# Patient Record
Sex: MALE | Race: WHITE | NOT HISPANIC OR LATINO
[De-identification: names, ages, dates, MRNs, and addresses within clinical notes are randomized per-mention and may not be internally consistent; named-entity substitution may affect disease eponyms.]

---

## 2018-11-01 ENCOUNTER — RECORD ABSTRACTING (OUTPATIENT)
Age: 53
End: 2018-11-01

## 2018-11-01 DIAGNOSIS — Z78.9 OTHER SPECIFIED HEALTH STATUS: ICD-10-CM

## 2018-11-01 DIAGNOSIS — Z86.39 PERSONAL HISTORY OF OTHER ENDOCRINE, NUTRITIONAL AND METABOLIC DISEASE: ICD-10-CM

## 2018-11-01 DIAGNOSIS — K57.30 DIVERTICULOSIS OF LARGE INTESTINE W/OUT PERFORATION OR ABSCESS W/OUT BLEEDING: ICD-10-CM

## 2018-11-01 DIAGNOSIS — Z87.09 PERSONAL HISTORY OF OTHER DISEASES OF THE RESPIRATORY SYSTEM: ICD-10-CM

## 2018-11-01 DIAGNOSIS — Z86.79 PERSONAL HISTORY OF OTHER DISEASES OF THE CIRCULATORY SYSTEM: ICD-10-CM

## 2018-11-01 DIAGNOSIS — Z83.3 FAMILY HISTORY OF DIABETES MELLITUS: ICD-10-CM

## 2018-12-04 ENCOUNTER — APPOINTMENT (OUTPATIENT)
Dept: FAMILY MEDICINE | Facility: CLINIC | Age: 53
End: 2018-12-04
Payer: COMMERCIAL

## 2018-12-04 VITALS
WEIGHT: 270 LBS | DIASTOLIC BLOOD PRESSURE: 70 MMHG | BODY MASS INDEX: 33.57 KG/M2 | SYSTOLIC BLOOD PRESSURE: 120 MMHG | HEIGHT: 75 IN

## 2018-12-04 PROCEDURE — 36415 COLL VENOUS BLD VENIPUNCTURE: CPT

## 2018-12-04 PROCEDURE — 99213 OFFICE O/P EST LOW 20 MIN: CPT | Mod: 25

## 2018-12-04 NOTE — REVIEW OF SYSTEMS
[Fever] : no fever [Chills] : no chills [Vision Problems] : no vision problems [Sore Throat] : no sore throat [Chest Pain] : no chest pain [Palpitations] : no palpitations [Cough] : no cough [Abdominal Pain] : no abdominal pain [Heartburn] : no heartburn [Frequency] : no frequency [Itching] : no itching [Skin Rash] : no skin rash [Headache] : no headache [Dizziness] : no dizziness [FreeTextEntry2] : No increased thirst or urination [FreeTextEntry9] : Improved right plantar fasciitis

## 2018-12-04 NOTE — PLAN
[FreeTextEntry1] : Continue present meds.\par Check fasting lipids, Ha1c.\par Attention to diet, exercise.

## 2018-12-04 NOTE — PHYSICAL EXAM
[No Acute Distress] : no acute distress [Clear to Auscultation] : lungs were clear to auscultation bilaterally [Normal S1, S2] : normal S1 and S2 [No Murmur] : no murmur heard [Soft] : abdomen soft [Non Tender] : non-tender [No Joint Swelling] : no joint swelling [Normal Gait] : normal gait [No Focal Deficits] : no focal deficits [de-identified] : Weight gain noted

## 2018-12-04 NOTE — HISTORY OF PRESENT ILLNESS
[FreeTextEntry1] : "I feel well. Just here for a check up.\par I can't believe I weight 270." [de-identified] : Pt feels well. Using orthotic for plantar fasciitis, which is helpful.\par Using Zorvolex only prn.\par Fasting today.

## 2018-12-06 ENCOUNTER — RX RENEWAL (OUTPATIENT)
Age: 53
End: 2018-12-06

## 2018-12-06 LAB
CHOLEST SERPL-MCNC: 132 MG/DL
CHOLEST/HDLC SERPL: 3.9 RATIO
HBA1C MFR BLD HPLC: 6.4 %
HDLC SERPL-MCNC: 34 MG/DL
LDLC SERPL CALC-MCNC: 60 MG/DL
TRIGL SERPL-MCNC: 191 MG/DL

## 2018-12-13 ENCOUNTER — RX RENEWAL (OUTPATIENT)
Age: 53
End: 2018-12-13

## 2019-03-18 ENCOUNTER — RX RENEWAL (OUTPATIENT)
Age: 54
End: 2019-03-18

## 2019-03-19 ENCOUNTER — APPOINTMENT (OUTPATIENT)
Dept: FAMILY MEDICINE | Facility: CLINIC | Age: 54
End: 2019-03-19

## 2019-03-26 ENCOUNTER — APPOINTMENT (OUTPATIENT)
Dept: FAMILY MEDICINE | Facility: CLINIC | Age: 54
End: 2019-03-26

## 2019-04-20 ENCOUNTER — APPOINTMENT (OUTPATIENT)
Dept: FAMILY MEDICINE | Facility: CLINIC | Age: 54
End: 2019-04-20
Payer: COMMERCIAL

## 2019-04-20 VITALS
DIASTOLIC BLOOD PRESSURE: 80 MMHG | BODY MASS INDEX: 33.32 KG/M2 | WEIGHT: 268 LBS | SYSTOLIC BLOOD PRESSURE: 120 MMHG | HEIGHT: 75 IN

## 2019-04-20 DIAGNOSIS — M50.90 CERVICAL DISC DISORDER, UNSPECIFIED, UNSPECIFIED CERVICAL REGION: ICD-10-CM

## 2019-04-20 PROCEDURE — 99214 OFFICE O/P EST MOD 30 MIN: CPT | Mod: 25

## 2019-04-20 PROCEDURE — 36415 COLL VENOUS BLD VENIPUNCTURE: CPT

## 2019-04-20 NOTE — HISTORY OF PRESENT ILLNESS
[FreeTextEntry1] : "I have had pains in my neck for a few weeks" [de-identified] : Pain in posterior neck, radiates to scapulae and shoulders. No weak grasp. Hears "creaking" on ROM neck

## 2019-04-20 NOTE — HEALTH RISK ASSESSMENT
[HIV Test offered] : HIV Test offered [Patient reported colonoscopy was abnormal] : Patient reported colonoscopy was abnormal [Hepatitis C test offered] : Hepatitis C test offered [ColonoscopyDate] : 02/12 [ColonoscopyComments] : Dr Boyce [HIVDate] : 06/16 [HepatitisCDate] : 06/16

## 2019-04-20 NOTE — PHYSICAL EXAM
[No Acute Distress] : no acute distress [Clear to Auscultation] : lungs were clear to auscultation bilaterally [Normal S1, S2] : normal S1 and S2 [No Murmur] : no murmur heard [Soft] : abdomen soft [Non Tender] : non-tender [Normal Gait] : normal gait [No Joint Swelling] : no joint swelling [de-identified] : Weight gain noted [de-identified] : No palpable masses or tenderness [No Focal Deficits] : no focal deficits [de-identified] : Grasp 5/5 bilaterally. DTRs intact

## 2019-04-20 NOTE — REVIEW OF SYSTEMS
[Fever] : no fever [Chills] : no chills [Sore Throat] : no sore throat [Vision Problems] : no vision problems [Chest Pain] : no chest pain [Palpitations] : no palpitations [Cough] : no cough [Abdominal Pain] : no abdominal pain [Heartburn] : no heartburn [Frequency] : no frequency [Joint Stiffness] : joint stiffness [Joint Pain] : joint pain [Muscle Pain] : muscle pain [Muscle Weakness] : no muscle weakness [Itching] : no itching [Joint Swelling] : no joint swelling [Skin Rash] : no skin rash [Headache] : no headache [Dizziness] : no dizziness [FreeTextEntry2] : No increased thirst or urination [FreeTextEntry9] : As per HPI

## 2019-04-22 LAB
ALBUMIN SERPL ELPH-MCNC: 4.8 G/DL
ALP BLD-CCNC: 59 U/L
ALT SERPL-CCNC: 62 U/L
ANION GAP SERPL CALC-SCNC: 13 MMOL/L
AST SERPL-CCNC: 51 U/L
BASOPHILS # BLD AUTO: 0.05 K/UL
BASOPHILS NFR BLD AUTO: 1 %
BILIRUB SERPL-MCNC: 0.5 MG/DL
BUN SERPL-MCNC: 18 MG/DL
CALCIUM SERPL-MCNC: 9.5 MG/DL
CHLORIDE SERPL-SCNC: 105 MMOL/L
CHOLEST SERPL-MCNC: 132 MG/DL
CHOLEST/HDLC SERPL: 4.9 RATIO
CO2 SERPL-SCNC: 24 MMOL/L
CREAT SERPL-MCNC: 0.69 MG/DL
EOSINOPHIL # BLD AUTO: 0 K/UL
EOSINOPHIL NFR BLD AUTO: 0 %
ESTIMATED AVERAGE GLUCOSE: 148 MG/DL
GLUCOSE SERPL-MCNC: 157 MG/DL
HBA1C MFR BLD HPLC: 6.8 %
HCT VFR BLD CALC: 48 %
HDLC SERPL-MCNC: 27 MG/DL
HGB BLD-MCNC: 15.2 G/DL
IMM GRANULOCYTES NFR BLD AUTO: 1 %
LDLC SERPL CALC-MCNC: 54 MG/DL
LYMPHOCYTES # BLD AUTO: 1.8 K/UL
LYMPHOCYTES NFR BLD AUTO: 34.4 %
MAN DIFF?: NORMAL
MCHC RBC-ENTMCNC: 30.9 PG
MCHC RBC-ENTMCNC: 31.7 GM/DL
MCV RBC AUTO: 97.6 FL
MONOCYTES # BLD AUTO: 0.35 K/UL
MONOCYTES NFR BLD AUTO: 6.7 %
NEUTROPHILS # BLD AUTO: 2.98 K/UL
NEUTROPHILS NFR BLD AUTO: 56.9 %
PLATELET # BLD AUTO: 187 K/UL
POTASSIUM SERPL-SCNC: 4 MMOL/L
PROT SERPL-MCNC: 7 G/DL
RBC # BLD: 4.92 M/UL
RBC # FLD: 14.4 %
SODIUM SERPL-SCNC: 142 MMOL/L
TRIGL SERPL-MCNC: 257 MG/DL
WBC # FLD AUTO: 5.23 K/UL

## 2019-05-10 ENCOUNTER — RESULT REVIEW (OUTPATIENT)
Age: 54
End: 2019-05-10

## 2019-05-21 ENCOUNTER — APPOINTMENT (OUTPATIENT)
Dept: CARDIOLOGY | Facility: CLINIC | Age: 54
End: 2019-05-21
Payer: COMMERCIAL

## 2019-05-21 ENCOUNTER — RECORD ABSTRACTING (OUTPATIENT)
Age: 54
End: 2019-05-21

## 2019-05-21 ENCOUNTER — RESULT REVIEW (OUTPATIENT)
Age: 54
End: 2019-05-21

## 2019-05-21 VITALS
DIASTOLIC BLOOD PRESSURE: 70 MMHG | SYSTOLIC BLOOD PRESSURE: 134 MMHG | BODY MASS INDEX: 32.33 KG/M2 | HEIGHT: 75 IN | HEART RATE: 83 BPM | WEIGHT: 260 LBS

## 2019-05-21 PROCEDURE — 93015 CV STRESS TEST SUPVJ I&R: CPT

## 2019-05-21 RX ORDER — DICLOFENAC 35 MG/1
35 CAPSULE ORAL
Refills: 0 | Status: DISCONTINUED | COMMUNITY
End: 2019-05-21

## 2019-05-21 NOTE — DISCUSSION/SUMMARY
[FreeTextEntry1] : Problem list/impressions/recommendations.\par Chest discomfort\par No evidence of myocardial ischemia. Stress test performed 5/21/19\par Focus on risk factor reduction.\par \par Hypertension\par Good control\par \par Dyslipidemia\par He is treated with a fibrate.\par Good LDL control. He may continue his fibrate but I also suggest therapeutic i-STAT treatment and a formal nutritional consultation for triglycerides. I do believe he is motivated.\par \par Type 2 diabetes.\par Treated with oral agents and therapeutic lifestyle treatment.\par \par Recommendation\par Focus on standard risk factor reduction\par Followup with his primary care physician\par We will remain available on request.

## 2019-05-21 NOTE — REASON FOR VISIT
[FreeTextEntry1] : Mr. 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 His wife he is nurse Yasmine Zeus at [employer].

## 2019-05-21 NOTE — PHYSICAL EXAM
[General Appearance - Well Developed] : well developed [Normal Appearance] : normal appearance [Well Groomed] : well groomed [General Appearance - Well Nourished] : well nourished [No Deformities] : no deformities [General Appearance - In No Acute Distress] : no acute distress [Normal Conjunctiva] : the conjunctiva exhibited no abnormalities [Eyelids - No Xanthelasma] : the eyelids demonstrated no xanthelasmas [Normal Oral Mucosa] : normal oral mucosa [No Oral Pallor] : no oral pallor [No Oral Cyanosis] : no oral cyanosis [Normal Jugular Venous A Waves Present] : normal jugular venous A waves present [Normal Jugular Venous V Waves Present] : normal jugular venous V waves present [No Jugular Venous Valerio A Waves] : no jugular venous valerio A waves [Respiration, Rhythm And Depth] : normal respiratory rhythm and effort [Exaggerated Use Of Accessory Muscles For Inspiration] : no accessory muscle use [Auscultation Breath Sounds / Voice Sounds] : lungs were clear to auscultation bilaterally [Heart Rate And Rhythm] : heart rate and rhythm were normal [Heart Sounds] : normal S1 and S2 [Murmurs] : no murmurs present [Abdomen Soft] : soft [Abdomen Tenderness] : non-tender [Abdomen Mass (___ Cm)] : no abdominal mass palpated [Abnormal Walk] : normal gait [Gait - Sufficient For Exercise Testing] : the gait was sufficient for exercise testing [Nail Clubbing] : no clubbing of the fingernails [Cyanosis, Localized] : no localized cyanosis [Petechial Hemorrhages (___cm)] : no petechial hemorrhages [Skin Color & Pigmentation] : normal skin color and pigmentation [] : no rash [No Venous Stasis] : no venous stasis [Skin Lesions] : no skin lesions [No Skin Ulcers] : no skin ulcer [No Xanthoma] : no  xanthoma was observed [Oriented To Time, Place, And Person] : oriented to person, place, and time [Affect] : the affect was normal [Mood] : the mood was normal [No Anxiety] : not feeling anxious

## 2019-05-21 NOTE — HISTORY OF PRESENT ILLNESS
[FreeTextEntry1] : 54-year-old male patient is seen for an expedited visit for stress testing.\par \par The patient was hospitalized overnight and had consultation by Doctor York for evaluation of chest is. He now presents for functional testing.\par \par Serial studies were negative for myocardial necrosis. He does have cardiovascular risk factors. Stress testing was expedited.

## 2019-05-21 NOTE — ASSESSMENT
[FreeTextEntry1] : EKG from hospital 5/21/19. Sinus rhythm borderline left axis. Within normal limits.\par Stress test 5/21/19. Normal. No ischemia. Jonatan stage III. 9.7 METs. Double product 26, 980

## 2019-05-24 ENCOUNTER — APPOINTMENT (OUTPATIENT)
Dept: FAMILY MEDICINE | Facility: CLINIC | Age: 54
End: 2019-05-24

## 2019-05-28 ENCOUNTER — INBOUND DOCUMENT (OUTPATIENT)
Age: 54
End: 2019-05-28

## 2019-06-24 ENCOUNTER — RX RENEWAL (OUTPATIENT)
Age: 54
End: 2019-06-24

## 2019-07-27 ENCOUNTER — RX RENEWAL (OUTPATIENT)
Age: 54
End: 2019-07-27

## 2019-08-12 ENCOUNTER — RX RENEWAL (OUTPATIENT)
Age: 54
End: 2019-08-12

## 2019-08-15 ENCOUNTER — RX CHANGE (OUTPATIENT)
Age: 54
End: 2019-08-15

## 2019-08-15 RX ORDER — OLMESARTAN MEDOXOMIL AND HYDROCHLOROTHIAZIDE 40; 12.5 MG/1; MG/1
40-12.5 TABLET ORAL
Qty: 90 | Refills: 3 | Status: DISCONTINUED | COMMUNITY
Start: 2019-06-24 | End: 2019-08-15

## 2019-08-22 ENCOUNTER — RX RENEWAL (OUTPATIENT)
Age: 54
End: 2019-08-22

## 2020-07-23 ENCOUNTER — APPOINTMENT (OUTPATIENT)
Dept: FAMILY MEDICINE | Facility: CLINIC | Age: 55
End: 2020-07-23
Payer: COMMERCIAL

## 2020-07-23 ENCOUNTER — LABORATORY RESULT (OUTPATIENT)
Age: 55
End: 2020-07-23

## 2020-07-23 VITALS
TEMPERATURE: 98.5 F | WEIGHT: 260 LBS | DIASTOLIC BLOOD PRESSURE: 88 MMHG | BODY MASS INDEX: 32.33 KG/M2 | HEIGHT: 75 IN | SYSTOLIC BLOOD PRESSURE: 128 MMHG

## 2020-07-23 DIAGNOSIS — K75.81 NONALCOHOLIC STEATOHEPATITIS (NASH): ICD-10-CM

## 2020-07-23 DIAGNOSIS — Z82.49 FAMILY HISTORY OF ISCHEMIC HEART DISEASE AND OTHER DISEASES OF THE CIRCULATORY SYSTEM: ICD-10-CM

## 2020-07-23 PROCEDURE — 36415 COLL VENOUS BLD VENIPUNCTURE: CPT

## 2020-07-23 PROCEDURE — 99396 PREV VISIT EST AGE 40-64: CPT | Mod: 25

## 2020-07-23 PROCEDURE — 99213 OFFICE O/P EST LOW 20 MIN: CPT | Mod: 25

## 2020-07-23 NOTE — HISTORY OF PRESENT ILLNESS
[de-identified] : Pt here for PE.\par Had colonoscopy 2/7/2012.  Eight biopsies.  All inflammatory tissue but no polyps.  Wants to wait 10 years for repeat.\par Ideal weight is 240.  Has been at 260 for many years.\par Requests COVID antibody testing.\par Takes meds as directed.  Has been taking omega 3s.  Everyday has shake with nutrients.\par Has hemochromatosis. Donates blood every 3-4 months (about four times a year.  Last donation was nearly six months ago.  \par

## 2020-07-23 NOTE — PHYSICAL EXAM
[No Acute Distress] : no acute distress [Well Nourished] : well nourished [Well Developed] : well developed [Well-Appearing] : well-appearing [Normal Sclera/Conjunctiva] : normal sclera/conjunctiva [PERRL] : pupils equal round and reactive to light [EOMI] : extraocular movements intact [Normal Outer Ear/Nose] : the outer ears and nose were normal in appearance [Normal Oropharynx] : the oropharynx was normal [No JVD] : no jugular venous distention [No Lymphadenopathy] : no lymphadenopathy [Supple] : supple [Thyroid Normal, No Nodules] : the thyroid was normal and there were no nodules present [No Respiratory Distress] : no respiratory distress  [Clear to Auscultation] : lungs were clear to auscultation bilaterally [No Accessory Muscle Use] : no accessory muscle use [Normal Rate] : normal rate  [Regular Rhythm] : with a regular rhythm [Normal S1, S2] : normal S1 and S2 [No Murmur] : no murmur heard [No Carotid Bruits] : no carotid bruits [No Abdominal Bruit] : a ~M bruit was not heard ~T in the abdomen [No Varicosities] : no varicosities [Pedal Pulses Present] : the pedal pulses are present [No Edema] : there was no peripheral edema [No Palpable Aorta] : no palpable aorta [No Extremity Clubbing/Cyanosis] : no extremity clubbing/cyanosis [Soft] : abdomen soft [Non Tender] : non-tender [Non-distended] : non-distended [No Masses] : no abdominal mass palpated [No HSM] : no HSM [Normal Bowel Sounds] : normal bowel sounds [Normal Posterior Cervical Nodes] : no posterior cervical lymphadenopathy [No CVA Tenderness] : no CVA  tenderness [Normal Anterior Cervical Nodes] : no anterior cervical lymphadenopathy [No Joint Swelling] : no joint swelling [No Spinal Tenderness] : no spinal tenderness [Grossly Normal Strength/Tone] : grossly normal strength/tone [Coordination Grossly Intact] : coordination grossly intact [No Rash] : no rash [No Focal Deficits] : no focal deficits [Normal Gait] : normal gait [Deep Tendon Reflexes (DTR)] : deep tendon reflexes were 2+ and symmetric [Normal Affect] : the affect was normal [Normal Insight/Judgement] : insight and judgment were intact [de-identified] : Mutiple dark moles across skin. Multiple tatoos.

## 2020-07-29 LAB
ALBUMIN SERPL ELPH-MCNC: 5 G/DL
ALP BLD-CCNC: 44 U/L
ALT SERPL-CCNC: 57 U/L
ANION GAP SERPL CALC-SCNC: 18 MMOL/L
AST SERPL-CCNC: 62 U/L
BASOPHILS # BLD AUTO: 0.05 K/UL
BASOPHILS NFR BLD AUTO: 0.7 %
BILIRUB SERPL-MCNC: 0.8 MG/DL
BUN SERPL-MCNC: 20 MG/DL
CALCIUM SERPL-MCNC: 9.8 MG/DL
CHLORIDE SERPL-SCNC: 104 MMOL/L
CHOLEST SERPL-MCNC: 135 MG/DL
CHOLEST/HDLC SERPL: 4.5 RATIO
CO2 SERPL-SCNC: 22 MMOL/L
CREAT SERPL-MCNC: 1.02 MG/DL
EOSINOPHIL # BLD AUTO: 0.46 K/UL
EOSINOPHIL NFR BLD AUTO: 6.6 %
ESTIMATED AVERAGE GLUCOSE: 146 MG/DL
FERRITIN SERPL-MCNC: 506 NG/ML
FOLATE SERPL-MCNC: >20 NG/ML
GLUCOSE SERPL-MCNC: 118 MG/DL
HBA1C MFR BLD HPLC: 6.7 %
HCT VFR BLD CALC: 47.3 %
HDLC SERPL-MCNC: 30 MG/DL
HGB BLD-MCNC: 15.1 G/DL
IMM GRANULOCYTES NFR BLD AUTO: 0.9 %
IRON SATN MFR SERPL: 40 %
IRON SERPL-MCNC: 165 UG/DL
LDLC SERPL CALC-MCNC: 47 MG/DL
LYMPHOCYTES # BLD AUTO: 1.65 K/UL
LYMPHOCYTES NFR BLD AUTO: 23.6 %
MAN DIFF?: NORMAL
MCHC RBC-ENTMCNC: 30.9 PG
MCHC RBC-ENTMCNC: 31.9 GM/DL
MCV RBC AUTO: 96.9 FL
MONOCYTES # BLD AUTO: 0.45 K/UL
MONOCYTES NFR BLD AUTO: 6.4 %
NEUTROPHILS # BLD AUTO: 4.33 K/UL
NEUTROPHILS NFR BLD AUTO: 61.8 %
PLATELET # BLD AUTO: 196 K/UL
POTASSIUM SERPL-SCNC: 4.1 MMOL/L
PROT SERPL-MCNC: 7.2 G/DL
PSA SERPL-MCNC: 1.8 NG/ML
RBC # BLD: 4.88 M/UL
RBC # FLD: 14.5 %
SARS-COV-2 IGG SERPL IA-ACNC: 0.08 INDEX
SARS-COV-2 IGG SERPL QL IA: NEGATIVE
SODIUM SERPL-SCNC: 144 MMOL/L
TIBC SERPL-MCNC: 409 UG/DL
TRIGL SERPL-MCNC: 290 MG/DL
TSH SERPL-ACNC: 0.92 UIU/ML
UIBC SERPL-MCNC: 243 UG/DL
VIT B12 SERPL-MCNC: 500 PG/ML
WBC # FLD AUTO: 7 K/UL

## 2020-07-30 RX ORDER — OLMESARTAN MEDOXOMIL AND HYDROCHLOROTHIAZIDE 40; 12.5 MG/1; MG/1
40-12.5 TABLET ORAL
Qty: 90 | Refills: 1 | Status: DISCONTINUED | COMMUNITY
Start: 1900-01-01 | End: 2020-07-30

## 2020-08-06 ENCOUNTER — TRANSCRIPTION ENCOUNTER (OUTPATIENT)
Age: 55
End: 2020-08-06

## 2020-10-29 ENCOUNTER — NON-APPOINTMENT (OUTPATIENT)
Age: 55
End: 2020-10-29

## 2020-10-30 ENCOUNTER — APPOINTMENT (OUTPATIENT)
Dept: CARDIOLOGY | Facility: CLINIC | Age: 55
End: 2020-10-30
Payer: COMMERCIAL

## 2020-10-30 ENCOUNTER — NON-APPOINTMENT (OUTPATIENT)
Age: 55
End: 2020-10-30

## 2020-10-30 VITALS
WEIGHT: 262 LBS | BODY MASS INDEX: 32.58 KG/M2 | HEART RATE: 60 BPM | HEIGHT: 75 IN | DIASTOLIC BLOOD PRESSURE: 82 MMHG | SYSTOLIC BLOOD PRESSURE: 136 MMHG

## 2020-10-30 DIAGNOSIS — E66.9 OBESITY, UNSPECIFIED: ICD-10-CM

## 2020-10-30 DIAGNOSIS — Z20.828 CONTACT WITH AND (SUSPECTED) EXPOSURE TO OTHER VIRAL COMMUNICABLE DISEASES: ICD-10-CM

## 2020-10-30 DIAGNOSIS — R07.89 OTHER CHEST PAIN: ICD-10-CM

## 2020-10-30 DIAGNOSIS — Z82.49 FAMILY HISTORY OF ISCHEMIC HEART DISEASE AND OTHER DISEASES OF THE CIRCULATORY SYSTEM: ICD-10-CM

## 2020-10-30 PROCEDURE — 99072 ADDL SUPL MATRL&STAF TM PHE: CPT

## 2020-10-30 PROCEDURE — 99213 OFFICE O/P EST LOW 20 MIN: CPT | Mod: 25

## 2020-10-30 PROCEDURE — 93000 ELECTROCARDIOGRAM COMPLETE: CPT | Mod: NC

## 2020-10-30 RX ORDER — BUDESONIDE 0.5 MG/2ML
0.5 INHALANT ORAL
Qty: 180 | Refills: 0 | Status: DISCONTINUED | COMMUNITY
Start: 2019-03-04 | End: 2020-10-30

## 2020-10-30 NOTE — DISCUSSION/SUMMARY
[FreeTextEntry1] : Brief recommendations and follow-up: (see above for details)\par \par The patient may proceed with his planned thyroid surgery.  Details as noted above.  They proceed.\par Blood pressure well controlled.\par LDL cholesterol excellent, therapeutic lifestyle treatment for the remainder of his lipid profile.\par Next routine cardiology visit 1 year.

## 2020-10-30 NOTE — HISTORY OF PRESENT ILLNESS
[FreeTextEntry1] : This 55 year-old male patient presents for cardiovascular evaluation.\par \par His problem list is as noted above.\par \par Patient is seen on an expedited basis at the request of his primary care physician and thyroid surgeon.\par \par The patient has had a thyroid mass that has been enlarging.  He is now anticipating surgery in the near future.\par \par Patient reports her been no major events otherwise or hospitalizations or serious illnesses.  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  He has been vigorously active.\par \par Of note is that the patient's daughter is getting  today.\par

## 2020-10-30 NOTE — REASON FOR VISIT
[FreeTextEntry1] : Mr. 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 His wife he is nurse Yasmine Pack at [employer].

## 2020-10-30 NOTE — ASSESSMENT
[FreeTextEntry1] : EKG 10/30/2020.  Sinus rhythm, first-degree AV block, within normal limits.\par EKG from hospital 5/21/19. Sinus rhythm borderline left axis. Within normal limits.\par Stress test 5/21/19. Normal. No ischemia. Jonatan stage III. 9.7 METs. Double product 26, 980

## 2020-11-06 ENCOUNTER — RESULT REVIEW (OUTPATIENT)
Age: 55
End: 2020-11-06

## 2020-11-08 ENCOUNTER — RESULT REVIEW (OUTPATIENT)
Age: 55
End: 2020-11-08

## 2020-11-09 ENCOUNTER — APPOINTMENT (OUTPATIENT)
Dept: FAMILY MEDICINE | Facility: CLINIC | Age: 55
End: 2020-11-09
Payer: COMMERCIAL

## 2020-11-09 VITALS
OXYGEN SATURATION: 97 % | SYSTOLIC BLOOD PRESSURE: 154 MMHG | DIASTOLIC BLOOD PRESSURE: 85 MMHG | BODY MASS INDEX: 33.5 KG/M2 | HEART RATE: 70 BPM | WEIGHT: 268 LBS

## 2020-11-09 PROCEDURE — 36415 COLL VENOUS BLD VENIPUNCTURE: CPT

## 2020-11-09 PROCEDURE — 99072 ADDL SUPL MATRL&STAF TM PHE: CPT

## 2020-11-09 PROCEDURE — 99215 OFFICE O/P EST HI 40 MIN: CPT | Mod: 25

## 2020-11-10 LAB
ALBUMIN SERPL ELPH-MCNC: 4.8 G/DL
ALP BLD-CCNC: 62 U/L
ALT SERPL-CCNC: 58 U/L
ANION GAP SERPL CALC-SCNC: 14 MMOL/L
APTT BLD: 34.5 SEC
AST SERPL-CCNC: 58 U/L
BASOPHILS # BLD AUTO: 0.05 K/UL
BASOPHILS NFR BLD AUTO: 0.8 %
BILIRUB SERPL-MCNC: 0.5 MG/DL
BUN SERPL-MCNC: 22 MG/DL
CALCIUM SERPL-MCNC: 9.7 MG/DL
CHLORIDE SERPL-SCNC: 105 MMOL/L
CO2 SERPL-SCNC: 22 MMOL/L
CREAT SERPL-MCNC: 0.91 MG/DL
EOSINOPHIL # BLD AUTO: 0.01 K/UL
EOSINOPHIL NFR BLD AUTO: 0.2 %
GLUCOSE SERPL-MCNC: 194 MG/DL
HCT VFR BLD CALC: 44.7 %
HGB BLD-MCNC: 15 G/DL
IMM GRANULOCYTES NFR BLD AUTO: 0.8 %
INR PPP: 1.06 RATIO
LYMPHOCYTES # BLD AUTO: 1.75 K/UL
LYMPHOCYTES NFR BLD AUTO: 28.9 %
MAN DIFF?: NORMAL
MCHC RBC-ENTMCNC: 31.5 PG
MCHC RBC-ENTMCNC: 33.6 GM/DL
MCV RBC AUTO: 93.9 FL
MONOCYTES # BLD AUTO: 0.41 K/UL
MONOCYTES NFR BLD AUTO: 6.8 %
NEUTROPHILS # BLD AUTO: 3.78 K/UL
NEUTROPHILS NFR BLD AUTO: 62.5 %
PLATELET # BLD AUTO: 200 K/UL
POTASSIUM SERPL-SCNC: 3.9 MMOL/L
PROT SERPL-MCNC: 7.1 G/DL
PT BLD: 12.5 SEC
RBC # BLD: 4.76 M/UL
RBC # FLD: 13.8 %
SODIUM SERPL-SCNC: 141 MMOL/L
WBC # FLD AUTO: 6.05 K/UL

## 2020-11-10 NOTE — PHYSICAL EXAM
[No Acute Distress] : no acute distress [Well Nourished] : well nourished [Well Developed] : well developed [Well-Appearing] : well-appearing [Normal Sclera/Conjunctiva] : normal sclera/conjunctiva [PERRL] : pupils equal round and reactive to light [EOMI] : extraocular movements intact [Normal Outer Ear/Nose] : the outer ears and nose were normal in appearance [Normal Oropharynx] : the oropharynx was normal [No JVD] : no jugular venous distention [No Lymphadenopathy] : no lymphadenopathy [Supple] : supple [No Respiratory Distress] : no respiratory distress  [No Accessory Muscle Use] : no accessory muscle use [Clear to Auscultation] : lungs were clear to auscultation bilaterally [Normal Rate] : normal rate  [Regular Rhythm] : with a regular rhythm [Normal S1, S2] : normal S1 and S2 [No Murmur] : no murmur heard [No Carotid Bruits] : no carotid bruits [No Abdominal Bruit] : a ~M bruit was not heard ~T in the abdomen [No Varicosities] : no varicosities [Pedal Pulses Present] : the pedal pulses are present [No Edema] : there was no peripheral edema [No Palpable Aorta] : no palpable aorta [No Extremity Clubbing/Cyanosis] : no extremity clubbing/cyanosis [Soft] : abdomen soft [Non Tender] : non-tender [Non-distended] : non-distended [No Masses] : no abdominal mass palpated [No HSM] : no HSM [Normal Bowel Sounds] : normal bowel sounds [Normal Posterior Cervical Nodes] : no posterior cervical lymphadenopathy [Normal Anterior Cervical Nodes] : no anterior cervical lymphadenopathy [No CVA Tenderness] : no CVA  tenderness [No Spinal Tenderness] : no spinal tenderness [No Joint Swelling] : no joint swelling [Grossly Normal Strength/Tone] : grossly normal strength/tone [No Rash] : no rash [Coordination Grossly Intact] : coordination grossly intact [No Focal Deficits] : no focal deficits [Normal Gait] : normal gait [Normal Affect] : the affect was normal [Normal Insight/Judgement] : insight and judgment were intact [de-identified] : Right side of thryoid with hypertrophic tissue/mass

## 2020-11-10 NOTE — HISTORY OF PRESENT ILLNESS
[FreeTextEntry1] : Needs pre op clearance for upcoming thyroid surgery 11/17/20. [de-identified] : Patient here for pre-op appointment.\par Diagnosed with neck mass.  Getting neck surgery.  Had biopsy today.\par Dr. Wang.  [facility].  Nov 17th.  In hospital for 24-48 hrs.\par Labs: CBC, CMET, PT PTT INR.\par Getting CXR from his surgeon.\par Had EKG from his cardiologist, Dr. Adriel Ball.\par Feels well. No complaints.  Feels ready for surgery.\par

## 2020-12-14 ENCOUNTER — RX RENEWAL (OUTPATIENT)
Age: 55
End: 2020-12-14

## 2020-12-31 ENCOUNTER — RESULT REVIEW (OUTPATIENT)
Age: 55
End: 2020-12-31

## 2021-01-06 ENCOUNTER — TRANSCRIPTION ENCOUNTER (OUTPATIENT)
Age: 56
End: 2021-01-06

## 2021-01-11 ENCOUNTER — NON-APPOINTMENT (OUTPATIENT)
Age: 56
End: 2021-01-11

## 2021-01-22 ENCOUNTER — NON-APPOINTMENT (OUTPATIENT)
Age: 56
End: 2021-01-22

## 2021-03-04 ENCOUNTER — RX RENEWAL (OUTPATIENT)
Age: 56
End: 2021-03-04

## 2021-04-21 ENCOUNTER — NON-APPOINTMENT (OUTPATIENT)
Age: 56
End: 2021-04-21

## 2021-06-21 ENCOUNTER — NON-APPOINTMENT (OUTPATIENT)
Age: 56
End: 2021-06-21

## 2021-06-21 ENCOUNTER — LABORATORY RESULT (OUTPATIENT)
Age: 56
End: 2021-06-21

## 2021-06-21 ENCOUNTER — APPOINTMENT (OUTPATIENT)
Dept: FAMILY MEDICINE | Facility: CLINIC | Age: 56
End: 2021-06-21
Payer: COMMERCIAL

## 2021-06-21 VITALS
DIASTOLIC BLOOD PRESSURE: 90 MMHG | BODY MASS INDEX: 32.58 KG/M2 | HEART RATE: 80 BPM | SYSTOLIC BLOOD PRESSURE: 148 MMHG | HEIGHT: 75 IN | WEIGHT: 262 LBS | OXYGEN SATURATION: 96 %

## 2021-06-21 PROCEDURE — 99214 OFFICE O/P EST MOD 30 MIN: CPT | Mod: 25

## 2021-06-21 PROCEDURE — 90750 HZV VACC RECOMBINANT IM: CPT

## 2021-06-21 PROCEDURE — 99072 ADDL SUPL MATRL&STAF TM PHE: CPT

## 2021-06-21 PROCEDURE — 99396 PREV VISIT EST AGE 40-64: CPT | Mod: 25

## 2021-06-21 PROCEDURE — 90471 IMMUNIZATION ADMIN: CPT

## 2021-06-21 PROCEDURE — 36415 COLL VENOUS BLD VENIPUNCTURE: CPT

## 2021-06-21 NOTE — PHYSICAL EXAM
[No Acute Distress] : no acute distress [Well Nourished] : well nourished [Well Developed] : well developed [Well-Appearing] : well-appearing [Normal Sclera/Conjunctiva] : normal sclera/conjunctiva [PERRL] : pupils equal round and reactive to light [EOMI] : extraocular movements intact [Normal Outer Ear/Nose] : the outer ears and nose were normal in appearance [Normal Oropharynx] : the oropharynx was normal [No JVD] : no jugular venous distention [No Lymphadenopathy] : no lymphadenopathy [Supple] : supple [Thyroid Normal, No Nodules] : the thyroid was normal and there were no nodules present [No Respiratory Distress] : no respiratory distress  [No Accessory Muscle Use] : no accessory muscle use [Clear to Auscultation] : lungs were clear to auscultation bilaterally [Normal Rate] : normal rate  [Regular Rhythm] : with a regular rhythm [Normal S1, S2] : normal S1 and S2 [No Murmur] : no murmur heard [No Carotid Bruits] : no carotid bruits [No Abdominal Bruit] : a ~M bruit was not heard ~T in the abdomen [No Varicosities] : no varicosities [Pedal Pulses Present] : the pedal pulses are present [No Edema] : there was no peripheral edema [No Palpable Aorta] : no palpable aorta [No Extremity Clubbing/Cyanosis] : no extremity clubbing/cyanosis [Soft] : abdomen soft [Non Tender] : non-tender [Non-distended] : non-distended [No Masses] : no abdominal mass palpated [No HSM] : no HSM [Normal Bowel Sounds] : normal bowel sounds [Normal Posterior Cervical Nodes] : no posterior cervical lymphadenopathy [Normal Anterior Cervical Nodes] : no anterior cervical lymphadenopathy [No CVA Tenderness] : no CVA  tenderness [No Spinal Tenderness] : no spinal tenderness [No Joint Swelling] : no joint swelling [Grossly Normal Strength/Tone] : grossly normal strength/tone [No Rash] : no rash [Coordination Grossly Intact] : coordination grossly intact [No Focal Deficits] : no focal deficits [Normal Gait] : normal gait [Deep Tendon Reflexes (DTR)] : deep tendon reflexes were 2+ and symmetric [Normal Affect] : the affect was normal [Normal Insight/Judgement] : insight and judgment were intact [de-identified] : Anterior scar well healed at site of neck surgery [de-identified] : Red, beefy slightly blanchable patches/plaques with some excoriations in the right groin, chest, upper leg.

## 2021-06-21 NOTE — HISTORY OF PRESENT ILLNESS
[de-identified] : Pt here for PE.\par Had neck surgery.  Feels well.  No known complications.\par c/o poison ivy/plant exposures.  Delores in the right groin.  Also the chest, abd, post leg.  Large surface area.  Very itchy.  Persistent.  Has had to use meds in past to help resolve it.\par Needs shingles vaccine.  Wants it today.\par Still taking BP meds and DM meds as prescribed.  No known SE from meds.\par Minimal alcohol consumption. Very occ cigar.\par Got COVID earlier this year.  Got Moderna 1st shot in April.  Not interested in getting second shot.\par No new complaints.\par

## 2021-06-24 LAB
ALBUMIN SERPL ELPH-MCNC: 4.9 G/DL
ALP BLD-CCNC: 52 U/L
ALT SERPL-CCNC: 54 U/L
ANION GAP SERPL CALC-SCNC: 15 MMOL/L
AST SERPL-CCNC: 47 U/L
BASOPHILS # BLD AUTO: 0.05 K/UL
BASOPHILS NFR BLD AUTO: 0.9 %
BILIRUB SERPL-MCNC: 0.7 MG/DL
BUN SERPL-MCNC: 18 MG/DL
CALCIUM SERPL-MCNC: 10 MG/DL
CHLORIDE SERPL-SCNC: 105 MMOL/L
CHOLEST SERPL-MCNC: 128 MG/DL
CO2 SERPL-SCNC: 23 MMOL/L
CREAT SERPL-MCNC: 0.87 MG/DL
EOSINOPHIL # BLD AUTO: 0.2 K/UL
EOSINOPHIL NFR BLD AUTO: 3.5 %
ESTIMATED AVERAGE GLUCOSE: 169 MG/DL
GLUCOSE SERPL-MCNC: 107 MG/DL
HBA1C MFR BLD HPLC: 7.5 %
HCT VFR BLD CALC: 45.5 %
HDLC SERPL-MCNC: 27 MG/DL
HGB BLD-MCNC: 15 G/DL
LDLC SERPL CALC-MCNC: 38 MG/DL
LYMPHOCYTES # BLD AUTO: 1.89 K/UL
LYMPHOCYTES NFR BLD AUTO: 32.7 %
MAN DIFF?: NORMAL
MCHC RBC-ENTMCNC: 30.6 PG
MCHC RBC-ENTMCNC: 33 GM/DL
MCV RBC AUTO: 92.9 FL
MONOCYTES # BLD AUTO: 0.25 K/UL
MONOCYTES NFR BLD AUTO: 4.4 %
NEUTROPHILS # BLD AUTO: 3.23 K/UL
NEUTROPHILS NFR BLD AUTO: 55.8 %
NONHDLC SERPL-MCNC: 101 MG/DL
PLATELET # BLD AUTO: 190 K/UL
POTASSIUM SERPL-SCNC: 3.6 MMOL/L
PROT SERPL-MCNC: 7.4 G/DL
PSA SERPL-MCNC: 1.52 NG/ML
RBC # BLD: 4.9 M/UL
RBC # FLD: 14.1 %
SODIUM SERPL-SCNC: 144 MMOL/L
T3 SERPL-MCNC: 119 NG/DL
T4 FREE SERPL-MCNC: 1.2 NG/DL
TRIGL SERPL-MCNC: 313 MG/DL
TSH SERPL-ACNC: 1.86 UIU/ML
WBC # FLD AUTO: 5.78 K/UL

## 2021-06-24 RX ORDER — FENOFIBRATE 160 MG/1
160 TABLET ORAL DAILY
Qty: 90 | Refills: 1 | Status: DISCONTINUED | COMMUNITY
Start: 2019-07-27 | End: 2021-06-24

## 2021-07-29 ENCOUNTER — RX RENEWAL (OUTPATIENT)
Age: 56
End: 2021-07-29

## 2021-08-09 ENCOUNTER — RX RENEWAL (OUTPATIENT)
Age: 56
End: 2021-08-09

## 2021-09-01 ENCOUNTER — RX RENEWAL (OUTPATIENT)
Age: 56
End: 2021-09-01

## 2021-09-10 ENCOUNTER — NON-APPOINTMENT (OUTPATIENT)
Age: 56
End: 2021-09-10

## 2021-09-30 ENCOUNTER — APPOINTMENT (OUTPATIENT)
Dept: FAMILY MEDICINE | Facility: CLINIC | Age: 56
End: 2021-09-30
Payer: COMMERCIAL

## 2021-09-30 ENCOUNTER — LABORATORY RESULT (OUTPATIENT)
Age: 56
End: 2021-09-30

## 2021-09-30 VITALS
DIASTOLIC BLOOD PRESSURE: 100 MMHG | TEMPERATURE: 98.6 F | BODY MASS INDEX: 32.58 KG/M2 | WEIGHT: 262 LBS | RESPIRATION RATE: 15 BRPM | SYSTOLIC BLOOD PRESSURE: 170 MMHG | HEART RATE: 52 BPM | HEIGHT: 75 IN

## 2021-09-30 DIAGNOSIS — R79.89 OTHER SPECIFIED ABNORMAL FINDINGS OF BLOOD CHEMISTRY: ICD-10-CM

## 2021-09-30 PROCEDURE — G0442 ANNUAL ALCOHOL SCREEN 15 MIN: CPT | Mod: 59

## 2021-09-30 PROCEDURE — 99214 OFFICE O/P EST MOD 30 MIN: CPT | Mod: 25

## 2021-09-30 PROCEDURE — 90472 IMMUNIZATION ADMIN EACH ADD: CPT

## 2021-09-30 PROCEDURE — G0444 DEPRESSION SCREEN ANNUAL: CPT | Mod: 59

## 2021-09-30 PROCEDURE — G0008: CPT | Mod: 59

## 2021-09-30 PROCEDURE — 90686 IIV4 VACC NO PRSV 0.5 ML IM: CPT

## 2021-09-30 PROCEDURE — 36415 COLL VENOUS BLD VENIPUNCTURE: CPT

## 2021-09-30 PROCEDURE — 90750 HZV VACC RECOMBINANT IM: CPT

## 2021-09-30 RX ORDER — METHYLPREDNISOLONE 4 MG/1
4 TABLET ORAL
Qty: 1 | Refills: 0 | Status: DISCONTINUED | COMMUNITY
Start: 2021-06-21 | End: 2021-09-30

## 2021-09-30 NOTE — HEALTH RISK ASSESSMENT
[Yes] : Yes [Monthly or less (1 pt)] : Monthly or less (1 point) [1 or 2 (0 pts)] : 1 or 2 (0 points) [Never (0 pts)] : Never (0 points) [No] : In the past 12 months have you used drugs other than those required for medical reasons? No [No falls in past year] : Patient reported no falls in the past year [0] : 2) Feeling down, depressed, or hopeless: Not at all (0) [Patient reported colonoscopy was normal] : Patient reported colonoscopy was normal [None] : None [With Significant Other] : lives with significant other [# of Members in Household ___] :  household currently consist of [unfilled] member(s) [Retired] : retired [Fully functional (bathing, dressing, toileting, transferring, walking, feeding)] : Fully functional (bathing, dressing, toileting, transferring, walking, feeding) [Fully functional (using the telephone, shopping, preparing meals, housekeeping, doing laundry, using] : Fully functional and needs no help or supervision to perform IADLs (using the telephone, shopping, preparing meals, housekeeping, doing laundry, using transportation, managing medications and managing finances) [Reports normal functional visual acuity (ie: able to read med bottle)] : Reports normal functional visual acuity [Smoke Detector] : smoke detector [Carbon Monoxide Detector] : carbon monoxide detector [Safety elements used in home] : safety elements used in home [Seat Belt] :  uses seat belt [Sunscreen] : uses sunscreen [] : No [de-identified] : None [de-identified] : Walking and weight lifting [de-identified] : All inclusive, limits sugar and carbs [Change in mental status noted] : No change in mental status noted [Language] : denies difficulty with language [Behavior] : denies difficulty with behavior [Learning/Retaining New Information] : denies difficulty learning/retaining new information [Handling Complex Tasks] : denies difficulty handling complex tasks [Reasoning] : denies difficulty with reasoning [Reports changes in hearing] : Reports no changes in hearing [Reports changes in vision] : Reports no changes in vision [Reports changes in dental health] : Reports no changes in dental health [Guns at Home] : no guns at home [Travel to Developing Areas] : does not  travel to developing areas [TB Exposure] : is not being exposed to tuberculosis [Caregiver Concerns] : does not have caregiver concerns [ColonoscopyDate] : 01/16 [FreeTextEntry2] : Retired  [AdvancecareDate] : 09/21

## 2021-09-30 NOTE — HISTORY OF PRESENT ILLNESS
[FreeTextEntry1] : Patient here for # 2 Shingrix and Influenza vaccines [de-identified] : Pt here for f/u.\par Took meds for poison ivy.  Went away.\par Increased metformin from 500mg daily to 500mg bid. No SE.  Walks a lot--just started lifting again.  Low carb, no sugar diet.\par Taking rosuvastatin.  Not aware of side effects.\par Stopped amlodipine.  Felt  much better and less tired . However, BP might be higher.\par Started metoprolol.  Unsure of any negative side effects.\par

## 2021-10-06 LAB
CHOLEST SERPL-MCNC: 90 MG/DL
HDLC SERPL-MCNC: 30 MG/DL
LDLC SERPL CALC-MCNC: 6 MG/DL
NONHDLC SERPL-MCNC: 60 MG/DL
TRIGL SERPL-MCNC: 270 MG/DL

## 2021-10-24 ENCOUNTER — RESULT REVIEW (OUTPATIENT)
Age: 56
End: 2021-10-24

## 2021-11-03 ENCOUNTER — NON-APPOINTMENT (OUTPATIENT)
Age: 56
End: 2021-11-03

## 2021-11-04 ENCOUNTER — APPOINTMENT (OUTPATIENT)
Dept: CARDIOLOGY | Facility: CLINIC | Age: 56
End: 2021-11-04
Payer: COMMERCIAL

## 2021-11-04 VITALS
HEART RATE: 66 BPM | WEIGHT: 263 LBS | TEMPERATURE: 98.6 F | BODY MASS INDEX: 32.7 KG/M2 | HEIGHT: 75 IN | SYSTOLIC BLOOD PRESSURE: 142 MMHG | DIASTOLIC BLOOD PRESSURE: 80 MMHG

## 2021-11-04 DIAGNOSIS — Z12.5 ENCOUNTER FOR SCREENING FOR MALIGNANT NEOPLASM OF PROSTATE: ICD-10-CM

## 2021-11-04 DIAGNOSIS — Z01.818 ENCOUNTER FOR OTHER PREPROCEDURAL EXAMINATION: ICD-10-CM

## 2021-11-04 DIAGNOSIS — M72.2 PLANTAR FASCIAL FIBROMATOSIS: ICD-10-CM

## 2021-11-04 PROCEDURE — 99214 OFFICE O/P EST MOD 30 MIN: CPT

## 2021-11-04 PROCEDURE — 93000 ELECTROCARDIOGRAM COMPLETE: CPT

## 2021-11-04 NOTE — HISTORY OF PRESENT ILLNESS
[FreeTextEntry1] : This 56 year-old male patient presents for cardiovascular evaluation.\par \par His problem list is as noted above.\par \par Since his last examination the patient did have successful removal of a thyroid mass.  Thankfully this was benign.  No complications.\par \par He remains very active.  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  His wife is a nurse at [employer].\par \par He is seen at the patient and his primary care physician's request.\par

## 2021-11-04 NOTE — REASON FOR VISIT
[FreeTextEntry1] : Mr. 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 His wife he is nurse Yasmine Pack at [employer].

## 2021-11-04 NOTE — CARDIOLOGY SUMMARY
[de-identified] : Stress test 5/21/19. Normal. No ischemia. Jonatan stage III. 9.7 METs. Double product\par     26, 980\par  [de-identified] : Echo during hospitalization 5/21/19. Normal LV function. EF 70%. Normal diastolic\par     dysfunction. Mild LVH, 1.2 cm. Normal valve morphology. Trivial, insignificant tricuspid\par     and pulmonic insufficiency.\par

## 2021-11-04 NOTE — ASSESSMENT
[FreeTextEntry1] : EKG 11/4/2021.  Sinus rhythm.  Borderline left axis.  Poor R wave progression.  No acute changes.\par EKG 10/30/2020.  Sinus rhythm, first-degree AV block, within normal limits.\par EKG from hospital 5/21/19. Sinus rhythm borderline left axis. Within normal limits.\par Stress test 5/21/19. Normal. No ischemia. Jonatan stage III. 9.7 METs. Double product 26 980

## 2021-11-04 NOTE — DISCUSSION/SUMMARY
[FreeTextEntry1] : Brief recommendations and follow-up: (see above for details)\par \par The patient's overall cardiovascular status is stable.\par I encouraged him regarding therapeutic lifestyle treatment, exercise and diet.  He is motivated.\par As noted he is now on a statin, no longer on a fibrate.  Dose can be adjusted as necessary.\par Work on diabetic control.\par Next routine cardiology visit 1 year.

## 2021-11-04 NOTE — REVIEW OF SYSTEMS
[Negative] : Heme/Lymph [FreeTextEntry5] : See HPI. [FreeTextEntry8] : Nocturia x2 or 3.  No ED. [FreeTextEntry9] : History of benign tumor, left knee.

## 2021-12-28 ENCOUNTER — RX RENEWAL (OUTPATIENT)
Age: 56
End: 2021-12-28

## 2022-02-22 ENCOUNTER — NON-APPOINTMENT (OUTPATIENT)
Age: 57
End: 2022-02-22

## 2022-03-18 ENCOUNTER — RESULT REVIEW (OUTPATIENT)
Age: 57
End: 2022-03-18

## 2022-04-18 ENCOUNTER — RX RENEWAL (OUTPATIENT)
Age: 57
End: 2022-04-18

## 2022-04-30 ENCOUNTER — RESULT REVIEW (OUTPATIENT)
Age: 57
End: 2022-04-30

## 2022-05-02 ENCOUNTER — RESULT REVIEW (OUTPATIENT)
Age: 57
End: 2022-05-02

## 2022-05-03 ENCOUNTER — APPOINTMENT (OUTPATIENT)
Dept: GASTROENTEROLOGY | Facility: HOSPITAL | Age: 57
End: 2022-05-03

## 2022-05-04 ENCOUNTER — NON-APPOINTMENT (OUTPATIENT)
Age: 57
End: 2022-05-04

## 2022-05-23 ENCOUNTER — RX RENEWAL (OUTPATIENT)
Age: 57
End: 2022-05-23

## 2022-05-23 DIAGNOSIS — L23.7 ALLERGIC CONTACT DERMATITIS DUE TO PLANTS, EXCEPT FOOD: ICD-10-CM

## 2022-06-29 ENCOUNTER — RX RENEWAL (OUTPATIENT)
Age: 57
End: 2022-06-29

## 2022-07-05 ENCOUNTER — RX RENEWAL (OUTPATIENT)
Age: 57
End: 2022-07-05

## 2022-09-11 ENCOUNTER — RESULT REVIEW (OUTPATIENT)
Age: 57
End: 2022-09-11

## 2022-09-13 ENCOUNTER — RESULT REVIEW (OUTPATIENT)
Age: 57
End: 2022-09-13

## 2022-09-14 ENCOUNTER — APPOINTMENT (OUTPATIENT)
Dept: GASTROENTEROLOGY | Facility: HOSPITAL | Age: 57
End: 2022-09-14

## 2022-09-15 ENCOUNTER — NON-APPOINTMENT (OUTPATIENT)
Age: 57
End: 2022-09-15

## 2022-09-30 ENCOUNTER — RX RENEWAL (OUTPATIENT)
Age: 57
End: 2022-09-30

## 2022-10-03 ENCOUNTER — RX RENEWAL (OUTPATIENT)
Age: 57
End: 2022-10-03

## 2022-10-26 ENCOUNTER — RX RENEWAL (OUTPATIENT)
Age: 57
End: 2022-10-26

## 2022-11-17 ENCOUNTER — RX RENEWAL (OUTPATIENT)
Age: 57
End: 2022-11-17

## 2022-12-14 ENCOUNTER — RX RENEWAL (OUTPATIENT)
Age: 57
End: 2022-12-14

## 2022-12-15 ENCOUNTER — RX RENEWAL (OUTPATIENT)
Age: 57
End: 2022-12-15

## 2023-02-08 ENCOUNTER — NON-APPOINTMENT (OUTPATIENT)
Age: 58
End: 2023-02-08

## 2023-02-12 ENCOUNTER — NON-APPOINTMENT (OUTPATIENT)
Age: 58
End: 2023-02-12

## 2023-02-13 ENCOUNTER — APPOINTMENT (OUTPATIENT)
Dept: CARDIOLOGY | Facility: CLINIC | Age: 58
End: 2023-02-13
Payer: COMMERCIAL

## 2023-02-13 ENCOUNTER — NON-APPOINTMENT (OUTPATIENT)
Age: 58
End: 2023-02-13

## 2023-02-13 VITALS
HEART RATE: 66 BPM | SYSTOLIC BLOOD PRESSURE: 142 MMHG | HEIGHT: 75 IN | BODY MASS INDEX: 33.94 KG/M2 | RESPIRATION RATE: 16 BRPM | WEIGHT: 273 LBS | DIASTOLIC BLOOD PRESSURE: 88 MMHG | TEMPERATURE: 98 F | OXYGEN SATURATION: 96 %

## 2023-02-13 DIAGNOSIS — Z01.810 ENCOUNTER FOR PREPROCEDURAL CARDIOVASCULAR EXAMINATION: ICD-10-CM

## 2023-02-13 DIAGNOSIS — U07.1 COVID-19: ICD-10-CM

## 2023-02-13 PROCEDURE — 99214 OFFICE O/P EST MOD 30 MIN: CPT | Mod: 25

## 2023-02-13 PROCEDURE — 93000 ELECTROCARDIOGRAM COMPLETE: CPT

## 2023-02-13 RX ORDER — PANTOPRAZOLE 40 MG/1
40 TABLET, DELAYED RELEASE ORAL
Qty: 30 | Refills: 3 | Status: DISCONTINUED | COMMUNITY
Start: 2022-05-26 | End: 2023-02-13
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
## 2023-02-13 NOTE — DISCUSSION/SUMMARY
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

## 2023-02-13 NOTE — HISTORY OF PRESENT ILLNESS
[FreeTextEntry1] : This 57 year-old male patient presents for cardiovascular evaluation.\par \par His problem list is as noted above.\par \par Since his last examination more than 1 year ago he reports no major events or hospitalizations.\par \par He did have COVID in January 2022.  He did have a high fever but declined any consideration of antiviral therapy.  He also declined previous vaccination.  I have again discussed this with him.\par \par As noted the patient is a retired .  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  He does more formal exercise 3 times a week.\par \par There are no acute symptoms of chest discomfort, shortness of breath, palpitation, lightheadedness, fainting.\par \par He is seen at the patient and his primary care physician's request.\par

## 2023-02-13 NOTE — ASSESSMENT
[FreeTextEntry1] : EKG 2/13/2023.  Sinus rhythm.  Within normal limits.  No acute changes.\par EKG 11/4/2021.  Sinus rhythm.  Borderline left axis.  Poor R wave progression.  No acute changes.\par EKG 10/30/2020.  Sinus rhythm, first-degree AV block, within normal limits.\par EKG from hospital 5/21/19. Sinus rhythm borderline left axis. Within normal limits.\par Stress test 5/21/19. Normal. No ischemia. Jonatan stage III. 9.7 METs. Double product 26 980

## 2023-02-13 NOTE — REASON FOR VISIT
[FreeTextEntry1] : Mr. 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 His wife he is nurse Yasmine Zeus at [employer].\par \par

## 2023-02-13 NOTE — CARDIOLOGY SUMMARY
[de-identified] : Stress test 5/21/19. Normal. No ischemia. Jonatan stage III. 9.7 METs. Double product\par     26, 980\par  [de-identified] : Echo during hospitalization 5/21/19. Normal LV function. EF 70%. Normal diastolic\par     dysfunction. Mild LVH, 1.2 cm. Normal valve morphology. Trivial, insignificant tricuspid\par     and pulmonic insufficiency.\par

## 2023-02-14 ENCOUNTER — RX RENEWAL (OUTPATIENT)
Age: 58
End: 2023-02-14

## 2023-03-02 ENCOUNTER — APPOINTMENT (OUTPATIENT)
Dept: FAMILY MEDICINE | Facility: CLINIC | Age: 58
End: 2023-03-02
Payer: COMMERCIAL

## 2023-03-02 VITALS
HEIGHT: 75 IN | OXYGEN SATURATION: 96 % | TEMPERATURE: 96.9 F | SYSTOLIC BLOOD PRESSURE: 132 MMHG | WEIGHT: 269 LBS | DIASTOLIC BLOOD PRESSURE: 82 MMHG | BODY MASS INDEX: 33.45 KG/M2 | HEART RATE: 62 BPM

## 2023-03-02 DIAGNOSIS — Z00.00 ENCOUNTER FOR GENERAL ADULT MEDICAL EXAMINATION W/OUT ABNORMAL FINDINGS: ICD-10-CM

## 2023-03-02 DIAGNOSIS — G47.09 OTHER INSOMNIA: ICD-10-CM

## 2023-03-02 DIAGNOSIS — K22.719 BARRETT'S ESOPHAGUS WITH DYSPLASIA, UNSPECIFIED: ICD-10-CM

## 2023-03-02 DIAGNOSIS — Z23 ENCOUNTER FOR IMMUNIZATION: ICD-10-CM

## 2023-03-02 PROCEDURE — 36415 COLL VENOUS BLD VENIPUNCTURE: CPT

## 2023-03-02 PROCEDURE — 99396 PREV VISIT EST AGE 40-64: CPT | Mod: 25
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
## 2023-03-06 LAB
ALBUMIN SERPL ELPH-MCNC: 5 G/DL
ALP BLD-CCNC: 59 U/L
ALT SERPL-CCNC: 66 U/L
ANION GAP SERPL CALC-SCNC: 18 MMOL/L
AST SERPL-CCNC: 55 U/L
BASOPHILS # BLD AUTO: 0.05 K/UL
BASOPHILS NFR BLD AUTO: 0.8 %
BILIRUB SERPL-MCNC: 0.8 MG/DL
BUN SERPL-MCNC: 14 MG/DL
CALCIUM SERPL-MCNC: 9.5 MG/DL
CHLORIDE SERPL-SCNC: 101 MMOL/L
CHOLEST SERPL-MCNC: 98 MG/DL
CO2 SERPL-SCNC: 24 MMOL/L
CREAT SERPL-MCNC: 0.72 MG/DL
EGFR: 107 ML/MIN/1.73M2
EOSINOPHIL # BLD AUTO: 0.18 K/UL
EOSINOPHIL NFR BLD AUTO: 2.7 %
ESTIMATED AVERAGE GLUCOSE: 154 MG/DL
GLUCOSE SERPL-MCNC: 134 MG/DL
HBA1C MFR BLD HPLC: 7 %
HCT VFR BLD CALC: 46.6 %
HDLC SERPL-MCNC: 37 MG/DL
HGB BLD-MCNC: 15.8 G/DL
IMM GRANULOCYTES NFR BLD AUTO: 0.6 %
LDLC SERPL CALC-MCNC: 15 MG/DL
LYMPHOCYTES # BLD AUTO: 1.99 K/UL
LYMPHOCYTES NFR BLD AUTO: 29.9 %
MAN DIFF?: NORMAL
MCHC RBC-ENTMCNC: 31.3 PG
MCHC RBC-ENTMCNC: 33.9 GM/DL
MCV RBC AUTO: 92.5 FL
MONOCYTES # BLD AUTO: 0.41 K/UL
MONOCYTES NFR BLD AUTO: 6.2 %
NEUTROPHILS # BLD AUTO: 3.99 K/UL
NEUTROPHILS NFR BLD AUTO: 59.8 %
NONHDLC SERPL-MCNC: 62 MG/DL
PLATELET # BLD AUTO: 176 K/UL
POTASSIUM SERPL-SCNC: 3.9 MMOL/L
PROT SERPL-MCNC: 7.1 G/DL
PSA SERPL-MCNC: 1.88 NG/ML
RBC # BLD: 5.04 M/UL
RBC # FLD: 14.3 %
SODIUM SERPL-SCNC: 143 MMOL/L
TRIGL SERPL-MCNC: 232 MG/DL
TSH SERPL-ACNC: 2.25 UIU/ML
WBC # FLD AUTO: 6.66 K/UL

## 2023-03-26 ENCOUNTER — RESULT REVIEW (OUTPATIENT)
Age: 58
End: 2023-03-26

## 2023-03-27 ENCOUNTER — APPOINTMENT (OUTPATIENT)
Dept: GASTROENTEROLOGY | Facility: HOSPITAL | Age: 58
End: 2023-03-27

## 2023-04-04 DIAGNOSIS — K21.9 GASTRO-ESOPHAGEAL REFLUX DISEASE W/OUT ESOPHAGITIS: ICD-10-CM

## 2023-04-10 ENCOUNTER — RX RENEWAL (OUTPATIENT)
Age: 58
End: 2023-04-10

## 2023-04-12 ENCOUNTER — TRANSCRIPTION ENCOUNTER (OUTPATIENT)
Age: 58
End: 2023-04-12

## 2023-04-12 ENCOUNTER — NON-APPOINTMENT (OUTPATIENT)
Age: 58
End: 2023-04-12

## 2023-04-13 ENCOUNTER — NON-APPOINTMENT (OUTPATIENT)
Age: 58
End: 2023-04-13

## 2023-04-17 ENCOUNTER — APPOINTMENT (OUTPATIENT)
Dept: FAMILY MEDICINE | Facility: CLINIC | Age: 58
End: 2023-04-17
Payer: COMMERCIAL

## 2023-04-17 ENCOUNTER — NON-APPOINTMENT (OUTPATIENT)
Age: 58
End: 2023-04-17

## 2023-04-17 ENCOUNTER — LABORATORY RESULT (OUTPATIENT)
Age: 58
End: 2023-04-17

## 2023-04-17 VITALS
HEART RATE: 67 BPM | OXYGEN SATURATION: 97 % | WEIGHT: 268 LBS | BODY MASS INDEX: 33.32 KG/M2 | SYSTOLIC BLOOD PRESSURE: 136 MMHG | HEIGHT: 75 IN | DIASTOLIC BLOOD PRESSURE: 80 MMHG

## 2023-04-17 DIAGNOSIS — E07.9 DISORDER OF THYROID, UNSPECIFIED: ICD-10-CM

## 2023-04-17 DIAGNOSIS — E83.119 HEMOCHROMATOSIS, UNSPECIFIED: ICD-10-CM

## 2023-04-17 DIAGNOSIS — E83.42 HYPOMAGNESEMIA: ICD-10-CM

## 2023-04-17 DIAGNOSIS — E87.6 HYPOKALEMIA: ICD-10-CM

## 2023-04-17 PROCEDURE — 99496 TRANSJ CARE MGMT HIGH F2F 7D: CPT | Mod: 25

## 2023-04-17 PROCEDURE — 36415 COLL VENOUS BLD VENIPUNCTURE: CPT

## 2023-04-17 NOTE — HISTORY OF PRESENT ILLNESS
[FreeTextEntry1] : Hospital follow up [de-identified] : Pt here for f/u.\par Had been at [facility] for new onset afib.  Admitted 4/11/23 and discharged 4/12/23.  \par A-fib symptoms started at home with chest sensation, nausea, dizziness.  \par Had been eating better and exercising more.\par Has seen Dr. Ball.  Weighs himself daily.  Takes blood sugars every morning.  Documenting the results.  Documenting exercise.\par Today is five days since discharge.  \par No se from Eliquis.  No blood in urine, stool, or sputum.\par Feels well today.\par \par

## 2023-04-18 ENCOUNTER — NON-APPOINTMENT (OUTPATIENT)
Age: 58
End: 2023-04-18

## 2023-04-19 ENCOUNTER — APPOINTMENT (OUTPATIENT)
Dept: CARDIOLOGY | Facility: CLINIC | Age: 58
End: 2023-04-19
Payer: COMMERCIAL

## 2023-04-19 ENCOUNTER — NON-APPOINTMENT (OUTPATIENT)
Age: 58
End: 2023-04-19

## 2023-04-19 VITALS
HEIGHT: 75 IN | BODY MASS INDEX: 33.45 KG/M2 | SYSTOLIC BLOOD PRESSURE: 138 MMHG | WEIGHT: 269 LBS | DIASTOLIC BLOOD PRESSURE: 84 MMHG

## 2023-04-19 PROCEDURE — 93000 ELECTROCARDIOGRAM COMPLETE: CPT

## 2023-04-19 PROCEDURE — 99215 OFFICE O/P EST HI 40 MIN: CPT | Mod: 25

## 2023-04-19 RX ORDER — ROSUVASTATIN CALCIUM 5 MG/1
5 TABLET, FILM COATED ORAL DAILY
Qty: 90 | Refills: 1 | Status: DISCONTINUED | COMMUNITY
Start: 2021-06-24 | End: 2023-04-19

## 2023-04-19 RX ORDER — OMEGA-3/DHA/EPA/FISH OIL 300-1000MG
1000 CAPSULE ORAL
Refills: 0 | Status: ACTIVE | COMMUNITY
Start: 2020-10-30

## 2023-04-19 RX ORDER — MAGNESIUM OXIDE 241.3 MG/1000MG
400 TABLET ORAL
Qty: 30 | Refills: 0 | Status: DISCONTINUED | COMMUNITY
Start: 2023-04-12 | End: 2023-04-19

## 2023-04-19 RX ORDER — LOSARTAN POTASSIUM AND HYDROCHLOROTHIAZIDE 12.5; 1 MG/1; MG/1
100-12.5 TABLET ORAL
Qty: 90 | Refills: 1 | Status: DISCONTINUED | COMMUNITY
Start: 2019-08-15 | End: 2023-04-19

## 2023-04-19 RX ORDER — POTASSIUM CHLORIDE 1500 MG/1
20 TABLET, EXTENDED RELEASE ORAL
Qty: 30 | Refills: 0 | Status: DISCONTINUED | COMMUNITY
Start: 2023-04-12 | End: 2023-04-19

## 2023-04-19 NOTE — CARDIOLOGY SUMMARY
[de-identified] : Stress test 5/21/19. Normal. No ischemia. Jonatan stage III. 9.7 METs. Double product 26, 980\par  [de-identified] : Echo 4/11/2023.  During hospitalization.  D.  K.  Normal LV function.  EF 71%.  LVH.  Mildly enlarged LA.  Trace MR.  Trace TR.  Similar to 2019.\par Echo during hospitalization 5/21/19. Normal LV function. EF 70%. Normal diastolic dysfunction. Mild LVH, 1.2 cm. Normal valve morphology. Trivial, insignificant tricuspid  and pulmonic insufficiency.\par

## 2023-04-19 NOTE — DISCUSSION/SUMMARY
[FreeTextEntry1] : Brief recommendations and follow-up: (see above for details)\par \par As noted the patient was recently at [facility] for paroxysmal atrial fibrillation.\par Hospital records review.\par He is currently in sinus rhythm.\par He has had potassium and magnesium supplementation.\par I am making a number of adjustments today:\par I am discontinuing his losartan–hydrochlorothiazide to valsartan 80 mg twice a day.\par I am increasing his Toprol-XL to 50 mg twice a day.\par Regarding his lipids, I am discontinuing his rosuvastatin because of a very low LDL and increasing his fish oils to 2 tablets twice a day for his elevated triglycerides.  Note he was previously on fenofibrate.\par He will continue on his Eliquis for now.\par He will return in 1 to 2 weeks time for clinical assessment with our nurse practitioner and placement of a 1 week [facility] event monitor.  The patient may perform his usual activities.  I did advise him regarding appropriate precautions and limitations.  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  Also draw full laboratories including CBC, general profile, fasting lipid profile, CPK, thyroid profile, magnesium.\par 1 month visit to me.
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
## 2023-04-19 NOTE — ASSESSMENT
[FreeTextEntry1] : EKG 4/19/2023.  Sinus rhythm.  First-degree AV block.  No acute features.\par EKG 2/13/2023.  Sinus rhythm.  Within normal limits.  No acute changes.\par EKG 11/4/2021.  Sinus rhythm.  Borderline left axis.  Poor R wave progression.  No acute changes.\par EKG 10/30/2020.  Sinus rhythm, first-degree AV block, within normal limits.\par EKG from hospital 5/21/19. Sinus rhythm borderline left axis. Within normal limits.\par Stress test 5/21/19. Normal. No ischemia. Jonatan stage III. 9.7 METs. Double product 26, 434

## 2023-04-19 NOTE — HISTORY OF PRESENT ILLNESS
[FreeTextEntry1] : This 58 year-old male patient presents for cardiovascular evaluation.\par \par His problem list is as noted above.\par \par The patient is seen post hospital after admission to [facility] last week for paroxysmal atrial fibrillation.\par \par The patient reports that he was in his usual state of health and awoke approximately 3 in the morning with a sensation of a rapid heartbeat.  He did not have additional symptoms of chest discomfort, shortness of breath.  Minimal lightheadedness.  He did not feel the need to seek initial medical attention.  The next morning his symptoms were persisting and he went to [facility] where his daughter and wife are nurses.  He was noted with an irregular heartbeat and referred to the emergency department where atrial fibrillation was confirmed.\par \par Records are reviewed.  He had mildly low potassium and magnesium and underwent replacement.  He was treated with diltiazem and Eliquis was utilized for anticoagulation.\par \par The patient now feels back to normal.  He has been exercising vigorously.  No exertional symptoms whatsoever.\par \par An echocardiogram was performed during his hospitalization that was satisfactory.\par \par

## 2023-04-19 NOTE — REASON FOR VISIT
[FreeTextEntry1] : Mr. 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 His wife he is nurse Yasmine Zeus at [employer].\par \par

## 2023-04-20 LAB
ALBUMIN SERPL ELPH-MCNC: 4.9 G/DL
ALP BLD-CCNC: 63 U/L
ALT SERPL-CCNC: 55 U/L
ANION GAP SERPL CALC-SCNC: 18 MMOL/L
AST SERPL-CCNC: 48 U/L
BASOPHILS # BLD AUTO: 0.05 K/UL
BASOPHILS NFR BLD AUTO: 0.8 %
BILIRUB SERPL-MCNC: 0.9 MG/DL
BUN SERPL-MCNC: 15 MG/DL
CALCIUM SERPL-MCNC: 9.7 MG/DL
CHLORIDE SERPL-SCNC: 102 MMOL/L
CHOLEST SERPL-MCNC: 121 MG/DL
CO2 SERPL-SCNC: 24 MMOL/L
CREAT SERPL-MCNC: 0.93 MG/DL
EGFR: 95 ML/MIN/1.73M2
EOSINOPHIL # BLD AUTO: 0 K/UL
EOSINOPHIL NFR BLD AUTO: 0 %
ESTIMATED AVERAGE GLUCOSE: 148 MG/DL
GLUCOSE SERPL-MCNC: 124 MG/DL
HBA1C MFR BLD HPLC: 6.8 %
HCT VFR BLD CALC: 47 %
HDLC SERPL-MCNC: 28 MG/DL
HGB BLD-MCNC: 15.6 G/DL
IMM GRANULOCYTES NFR BLD AUTO: 0.8 %
LDLC SERPL CALC-MCNC: NORMAL MG/DL
LYMPHOCYTES # BLD AUTO: 1.94 K/UL
LYMPHOCYTES NFR BLD AUTO: 29.2 %
MAGNESIUM SERPL-MCNC: 1.5 MG/DL
MAN DIFF?: NORMAL
MCHC RBC-ENTMCNC: 31 PG
MCHC RBC-ENTMCNC: 33.2 GM/DL
MCV RBC AUTO: 93.3 FL
MONOCYTES # BLD AUTO: 0.42 K/UL
MONOCYTES NFR BLD AUTO: 6.3 %
NEUTROPHILS # BLD AUTO: 4.19 K/UL
NEUTROPHILS NFR BLD AUTO: 62.9 %
NONHDLC SERPL-MCNC: 93 MG/DL
PLATELET # BLD AUTO: 212 K/UL
POTASSIUM SERPL-SCNC: 3.8 MMOL/L
PROT SERPL-MCNC: 7.1 G/DL
RBC # BLD: 5.04 M/UL
RBC # FLD: 15.1 %
SODIUM SERPL-SCNC: 144 MMOL/L
TRIGL SERPL-MCNC: 588 MG/DL
WBC # FLD AUTO: 6.65 K/UL

## 2023-05-03 ENCOUNTER — APPOINTMENT (OUTPATIENT)
Dept: CARDIOLOGY | Facility: CLINIC | Age: 58
End: 2023-05-03
Payer: COMMERCIAL

## 2023-05-03 ENCOUNTER — LABORATORY RESULT (OUTPATIENT)
Age: 58
End: 2023-05-03

## 2023-05-03 ENCOUNTER — NON-APPOINTMENT (OUTPATIENT)
Age: 58
End: 2023-05-03

## 2023-05-03 VITALS
DIASTOLIC BLOOD PRESSURE: 86 MMHG | SYSTOLIC BLOOD PRESSURE: 156 MMHG | WEIGHT: 276 LBS | HEIGHT: 75 IN | BODY MASS INDEX: 34.32 KG/M2 | HEART RATE: 72 BPM

## 2023-05-03 PROCEDURE — 99212 OFFICE O/P EST SF 10 MIN: CPT | Mod: 25

## 2023-05-03 PROCEDURE — 36415 COLL VENOUS BLD VENIPUNCTURE: CPT

## 2023-05-03 PROCEDURE — 93224 XTRNL ECG REC UP TO 48 HRS: CPT

## 2023-05-03 PROCEDURE — 93246 EXT ECG>7D<15D RECORDING: CPT

## 2023-05-03 NOTE — HISTORY OF PRESENT ILLNESS
[FreeTextEntry1] : This 58 year-old male patient presents for cardiovascular evaluation.\par \par His problem list is as noted above.\par \par \par Patient returns for evaluation of paroxysmal atrial fibrillation, blood pressure evaluation, medication adjustment.\par \par Of note is that the patient was here this morning for fasting laboratories and unfortunately due to his scheduling mishap did not see the nurse practitioner and have a ZIO monitor placed as planned.\par \par We called the patient and he was kind enough to return and I evaluated him personally.\par \par The patient has been active and exercising.  He feels well.  He purchased a cardio mobile type device.\par
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
## 2023-05-03 NOTE — ASSESSMENT
[FreeTextEntry1] : EKG 4/19/2023.  Sinus rhythm.  First-degree AV block.  No acute features.\par EKG 2/13/2023.  Sinus rhythm.  Within normal limits.  No acute changes.\par EKG 11/4/2021.  Sinus rhythm.  Borderline left axis.  Poor R wave progression.  No acute changes.\par EKG 10/30/2020.  Sinus rhythm, first-degree AV block, within normal limits.\par EKG from hospital 5/21/19. Sinus rhythm borderline left axis. Within normal limits.\par Stress test 5/21/19. Normal. No ischemia. Jonatan stage III. 9.7 METs. Double product 26, 027

## 2023-05-03 NOTE — DISCUSSION/SUMMARY
[FreeTextEntry1] : Brief recommendations and follow-up: (see above for details)\par \par The patient returns today for a ZIO monitor.\par Fasting laboratories were drawn today.\par He is tolerating his medication changes.\par It will take some time to get the heart rate/arrhythmia reports and we will move back his appointment 2 weeks from the currently scheduled appointment.

## 2023-05-03 NOTE — CARDIOLOGY SUMMARY
[de-identified] : Stress test 5/21/19. Normal. No ischemia. Jonatan stage III. 9.7 METs. Double product 26, 980\par  [de-identified] : Echo 4/11/2023.  During hospitalization.  D.  K.  Normal LV function.  EF 71%.  LVH.  Mildly enlarged LA.  Trace MR.  Trace TR.  Similar to 2019.\par Echo during hospitalization 5/21/19. Normal LV function. EF 70%. Normal diastolic dysfunction. Mild LVH, 1.2 cm. Normal valve morphology. Trivial, insignificant tricuspid  and pulmonic insufficiency.\par

## 2023-05-03 NOTE — REASON FOR VISIT
[FreeTextEntry1] : Mr. 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 His wife he is nurse Yasmine Zeus at [place].\par \par

## 2023-05-08 LAB
ALBUMIN SERPL ELPH-MCNC: 4.6 G/DL
ALP BLD-CCNC: 65 U/L
ALT SERPL-CCNC: 57 U/L
ANION GAP SERPL CALC-SCNC: 15 MMOL/L
AST SERPL-CCNC: 50 U/L
BASOPHILS # BLD AUTO: 0 K/UL
BASOPHILS NFR BLD AUTO: 0 %
BILIRUB SERPL-MCNC: 0.6 MG/DL
BUN SERPL-MCNC: 13 MG/DL
CALCIUM SERPL-MCNC: 8.9 MG/DL
CHLORIDE SERPL-SCNC: 105 MMOL/L
CHOLEST SERPL-MCNC: 154 MG/DL
CK SERPL-CCNC: 344 U/L
CO2 SERPL-SCNC: 22 MMOL/L
CREAT SERPL-MCNC: 0.58 MG/DL
EGFR: 113 ML/MIN/1.73M2
EOSINOPHIL # BLD AUTO: 0.22 K/UL
EOSINOPHIL NFR BLD AUTO: 3.4 %
GLUCOSE SERPL-MCNC: 158 MG/DL
HCT VFR BLD CALC: 45.7 %
HDLC SERPL-MCNC: 27 MG/DL
HGB BLD-MCNC: 14.9 G/DL
LDLC SERPL CALC-MCNC: NORMAL MG/DL
LYMPHOCYTES # BLD AUTO: 2.05 K/UL
LYMPHOCYTES NFR BLD AUTO: 31.1 %
MAGNESIUM SERPL-MCNC: 1.5 MG/DL
MAN DIFF?: NORMAL
MCHC RBC-ENTMCNC: 30.8 PG
MCHC RBC-ENTMCNC: 32.6 GM/DL
MCV RBC AUTO: 94.4 FL
MONOCYTES # BLD AUTO: 0.39 K/UL
MONOCYTES NFR BLD AUTO: 5.9 %
NEUTROPHILS # BLD AUTO: 3.88 K/UL
NEUTROPHILS NFR BLD AUTO: 58.8 %
NONHDLC SERPL-MCNC: 127 MG/DL
PLATELET # BLD AUTO: 159 K/UL
POTASSIUM SERPL-SCNC: 3.6 MMOL/L
PROT SERPL-MCNC: 6.7 G/DL
RBC # BLD: 4.84 M/UL
RBC # FLD: 15 %
SODIUM SERPL-SCNC: 141 MMOL/L
T3 SERPL-MCNC: 147 NG/DL
T4 SERPL-MCNC: 9.3 UG/DL
TRIGL SERPL-MCNC: 543 MG/DL
TSH SERPL-ACNC: 2.31 UIU/ML
WBC # FLD AUTO: 6.6 K/UL

## 2023-05-09 ENCOUNTER — NON-APPOINTMENT (OUTPATIENT)
Age: 58
End: 2023-05-09

## 2023-05-31 PROCEDURE — 93248 EXT ECG>7D<15D REV&INTERPJ: CPT

## 2023-06-07 ENCOUNTER — RX RENEWAL (OUTPATIENT)
Age: 58
End: 2023-06-07

## 2023-06-12 ENCOUNTER — NON-APPOINTMENT (OUTPATIENT)
Age: 58
End: 2023-06-12

## 2023-06-13 ENCOUNTER — NON-APPOINTMENT (OUTPATIENT)
Age: 58
End: 2023-06-13

## 2023-06-13 ENCOUNTER — APPOINTMENT (OUTPATIENT)
Dept: CARDIOLOGY | Facility: CLINIC | Age: 58
End: 2023-06-13
Payer: COMMERCIAL

## 2023-06-13 VITALS
DIASTOLIC BLOOD PRESSURE: 88 MMHG | HEIGHT: 75 IN | SYSTOLIC BLOOD PRESSURE: 148 MMHG | BODY MASS INDEX: 33.94 KG/M2 | WEIGHT: 273 LBS

## 2023-06-13 DIAGNOSIS — I10 ESSENTIAL (PRIMARY) HYPERTENSION: ICD-10-CM

## 2023-06-13 DIAGNOSIS — E11.9 TYPE 2 DIABETES MELLITUS W/OUT COMPLICATIONS: ICD-10-CM

## 2023-06-13 DIAGNOSIS — E78.5 HYPERLIPIDEMIA, UNSPECIFIED: ICD-10-CM

## 2023-06-13 DIAGNOSIS — Z01.89 ENCOUNTER FOR OTHER SPECIFIED SPECIAL EXAMINATIONS: ICD-10-CM

## 2023-06-13 PROCEDURE — 93000 ELECTROCARDIOGRAM COMPLETE: CPT

## 2023-06-13 PROCEDURE — 99214 OFFICE O/P EST MOD 30 MIN: CPT | Mod: 25

## 2023-06-13 RX ORDER — BETAMETHASONE DIPROPIONATE 0.5 MG/G
0.05 OINTMENT TOPICAL
Qty: 45 | Refills: 1 | Status: DISCONTINUED | COMMUNITY
Start: 2022-05-23 | End: 2023-06-13

## 2023-06-13 NOTE — DISCUSSION/SUMMARY
[FreeTextEntry1] : Brief recommendations and follow-up: (see above for details)\par \par As noted the patient remains with paroxysmal atrial fibrillation.\par He will continue on his current regimen however I am now recommending formal electrophysiologic consultation.  He will be calling to see physicians I recommended in the [place] office closer to his home.\par The patient reports that he does have fatty liver confirmed on previous biopsy.\par Focus on therapeutic lifestyle treatment and diabetic control.\par Patient will look into formal nutritional consultation at [place].\par Also continue diabetes management with his primary care physician.\par Therapeutic lifestyle treatment continue for blood pressure control.\par Next full appointment with me 6 months, adjust as necessary after EP consultation.

## 2023-06-13 NOTE — ASSESSMENT
[FreeTextEntry1] : EKG 6/13/2023.  Sinus rhythm.  First-degree AV block.  Minor interventricular conduction delay.  No acute features.\par EKG 4/19/2023.  Sinus rhythm.  First-degree AV block.  No acute features.\par EKG 2/13/2023.  Sinus rhythm.  Within normal limits.  No acute changes.\par EKG 11/4/2021.  Sinus rhythm.  Borderline left axis.  Poor R wave progression.  No acute changes.\par EKG 10/30/2020.  Sinus rhythm, first-degree AV block, within normal limits.\par EKG from hospital 5/21/19. Sinus rhythm borderline left axis. Within normal limits.\par Stress test 5/21/19. Normal. No ischemia. Jonatan stage III. 9.7 METs. Double product 26, 980

## 2023-06-13 NOTE — HISTORY OF PRESENT ILLNESS
[FreeTextEntry1] : This 58 year-old male patient presents for cardiovascular evaluation.\par \par His problem list is as noted above.\par \par Since his last examination approximately 1 month ago the patient has completed his event monitoring.  This demonstrated an overall 5% burden of paroxysmal atrial fibrillation.\par \par The patient has been following his monitor with a cardia mobile device.  It has demonstrated episodic atrial fibrillation.  He provided some tracings that are accurate both in sinus rhythm and atrial fibrillation.\par \par The patient again had laboratories that demonstrated elevated triglycerides and relatively mild LFT abnormalities as noted previously.\par \par
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
## 2023-06-13 NOTE — CARDIOLOGY SUMMARY
[de-identified] : Event monitor completed 5/16/2023.  Sinus rhythm with first-degree AV block.  Rare APC.  Paroxysmal atrial fibrillation present, overall 5% burden.  Longest 8 hours on 5 13-5 14 with a total of approximately 12 hours.  4 total episodes.  The others were very short, 30 seconds or up to 2 minutes.  These appear to be asymptomatic.  Rare VPC.\par  [de-identified] : Stress test 5/21/19. Normal. No ischemia. Jonatan stage III. 9.7 METs. Double product 26, 980\par  [de-identified] : Echo 4/11/2023.  During hospitalization.  D.  K.  Normal LV function.  EF 71%.  LVH.  Mildly enlarged LA.  Trace MR.  Trace TR.  Similar to 2019.\par Echo during hospitalization 5/21/19. Normal LV function. EF 70%. Normal diastolic dysfunction. Mild LVH, 1.2 cm. Normal valve morphology. Trivial, insignificant tricuspid  and pulmonic insufficiency.\par

## 2023-06-13 NOTE — REASON FOR VISIT
[FreeTextEntry3] : Doctor Gottlock [FreeTextEntry1] : Mr. AGUSTIN PACK has the following problem list:\par \par Hypertension\par Dyslipidemia\par Type 2 diabetes.\par Obesity\par Paroxysmal atrial fibrillation, onset in April 2023.\par \par He has additional medical problems as noted.\par This includes hemochromatosis.  Villalta's esophagus.  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 His wife he is nurse Yasmine Pack at [employer].\par \par

## 2023-07-10 ENCOUNTER — RX RENEWAL (OUTPATIENT)
Age: 58
End: 2023-07-10

## 2023-08-11 ENCOUNTER — NON-APPOINTMENT (OUTPATIENT)
Age: 58
End: 2023-08-11

## 2023-08-11 ENCOUNTER — APPOINTMENT (OUTPATIENT)
Dept: HEART AND VASCULAR | Facility: CLINIC | Age: 58
End: 2023-08-11
Payer: COMMERCIAL

## 2023-08-11 VITALS
WEIGHT: 264 LBS | BODY MASS INDEX: 32.83 KG/M2 | RESPIRATION RATE: 16 BRPM | TEMPERATURE: 97.6 F | DIASTOLIC BLOOD PRESSURE: 77 MMHG | HEART RATE: 58 BPM | SYSTOLIC BLOOD PRESSURE: 134 MMHG | HEIGHT: 75 IN | OXYGEN SATURATION: 95 %

## 2023-08-11 PROCEDURE — 99214 OFFICE O/P EST MOD 30 MIN: CPT | Mod: 25

## 2023-08-11 PROCEDURE — 93000 ELECTROCARDIOGRAM COMPLETE: CPT

## 2023-08-11 NOTE — END OF VISIT
[FreeTextEntry3] : I, Liudmila Christina, am scribing for (in the presence of) Dr. Davis the following sections: HPI, PMH,Family/social history, ROS, Physical Exam, Assessment / Plan.   I, Quirino Davis, personally performed the services described in the documentation, reviewed the documentation recorded by the scribe in my presence and it accurately and completely records my words and actions.

## 2023-08-11 NOTE — REASON FOR VISIT
[Arrhythmia/ECG Abnorrmalities] : arrhythmia/ECG abnormalities [FreeTextEntry1] : Mr. Schulz is a 58 y.o. M with HTN, HLD, DM, obesity, Villalta's esophagus, and PAF who presents for an initial evaluation.   Pt presented to [place] 4/2023 after noticing palpitations and elevated HR to 120bpm and was found to be in AF RVR. He was given IV cardizem and self-converted. The patient now uses the [employer] Mobile and lasted noted AF to be 8/8/2023. He has been compliant with Eliquis for stroke/TE prophylaxis. He is also taking Toprol 50mg BID.   Echo (4/2023): LVEF 71%, mild LVH,   ZIO (5/2023): AF burden 5%, longest 8h 8m, avg rate in AF 71 bpm  Wife is nurse at [place]

## 2023-10-16 ENCOUNTER — OUTPATIENT (OUTPATIENT)
Dept: OUTPATIENT SERVICES | Facility: HOSPITAL | Age: 58
LOS: 1 days | Discharge: HOME | End: 2023-10-16
Payer: COMMERCIAL

## 2023-10-16 ENCOUNTER — APPOINTMENT (OUTPATIENT)
Dept: CT IMAGING | Facility: HOSPITAL | Age: 58
End: 2023-10-16

## 2023-10-16 ENCOUNTER — RESULT REVIEW (OUTPATIENT)
Age: 58
End: 2023-10-16

## 2023-10-16 VITALS
WEIGHT: 265 LBS | HEART RATE: 60 BPM | RESPIRATION RATE: 18 BRPM | HEIGHT: 75 IN | DIASTOLIC BLOOD PRESSURE: 72 MMHG | SYSTOLIC BLOOD PRESSURE: 161 MMHG | OXYGEN SATURATION: 96 %

## 2023-10-16 DIAGNOSIS — I48.0 PAROXYSMAL ATRIAL FIBRILLATION: ICD-10-CM

## 2023-10-16 LAB
ANION GAP SERPL CALC-SCNC: 13 MMOL/L — SIGNIFICANT CHANGE UP (ref 5–17)
APTT BLD: 41.6 SEC — HIGH (ref 24.5–35.6)
BLD GP AB SCN SERPL QL: NEGATIVE — SIGNIFICANT CHANGE UP
BUN SERPL-MCNC: 15 MG/DL — SIGNIFICANT CHANGE UP (ref 7–23)
CALCIUM SERPL-MCNC: 9.4 MG/DL — SIGNIFICANT CHANGE UP (ref 8.4–10.5)
CHLORIDE SERPL-SCNC: 105 MMOL/L — SIGNIFICANT CHANGE UP (ref 96–108)
CO2 SERPL-SCNC: 25 MMOL/L — SIGNIFICANT CHANGE UP (ref 22–31)
CREAT SERPL-MCNC: 0.61 MG/DL — SIGNIFICANT CHANGE UP (ref 0.5–1.3)
EGFR: 111 ML/MIN/1.73M2 — SIGNIFICANT CHANGE UP
GLUCOSE SERPL-MCNC: 159 MG/DL — HIGH (ref 70–99)
HCT VFR BLD CALC: 45.8 % — SIGNIFICANT CHANGE UP (ref 39–50)
HGB BLD-MCNC: 16.1 G/DL — SIGNIFICANT CHANGE UP (ref 13–17)
INR BLD: 1.34 — HIGH (ref 0.85–1.18)
MCHC RBC-ENTMCNC: 31.3 PG — SIGNIFICANT CHANGE UP (ref 27–34)
MCHC RBC-ENTMCNC: 35.2 GM/DL — SIGNIFICANT CHANGE UP (ref 32–36)
MCV RBC AUTO: 89.1 FL — SIGNIFICANT CHANGE UP (ref 80–100)
NRBC # BLD: 0 /100 WBCS — SIGNIFICANT CHANGE UP (ref 0–0)
PLATELET # BLD AUTO: 195 K/UL — SIGNIFICANT CHANGE UP (ref 150–400)
POTASSIUM SERPL-MCNC: 3.6 MMOL/L — SIGNIFICANT CHANGE UP (ref 3.5–5.3)
POTASSIUM SERPL-SCNC: 3.6 MMOL/L — SIGNIFICANT CHANGE UP (ref 3.5–5.3)
PROTHROM AB SERPL-ACNC: 15.2 SEC — HIGH (ref 9.5–13)
RBC # BLD: 5.14 M/UL — SIGNIFICANT CHANGE UP (ref 4.2–5.8)
RBC # FLD: 14.5 % — SIGNIFICANT CHANGE UP (ref 10.3–14.5)
RH IG SCN BLD-IMP: POSITIVE — SIGNIFICANT CHANGE UP
SODIUM SERPL-SCNC: 143 MMOL/L — SIGNIFICANT CHANGE UP (ref 135–145)
WBC # BLD: 6.02 K/UL — SIGNIFICANT CHANGE UP (ref 3.8–10.5)
WBC # FLD AUTO: 6.02 K/UL — SIGNIFICANT CHANGE UP (ref 3.8–10.5)

## 2023-10-16 PROCEDURE — 82803 BLOOD GASES ANY COMBINATION: CPT

## 2023-10-16 PROCEDURE — 84132 ASSAY OF SERUM POTASSIUM: CPT

## 2023-10-16 PROCEDURE — C1894: CPT

## 2023-10-16 PROCEDURE — C1733: CPT

## 2023-10-16 PROCEDURE — 82947 ASSAY GLUCOSE BLOOD QUANT: CPT

## 2023-10-16 PROCEDURE — 75572 CT HRT W/3D IMAGE: CPT | Mod: 26

## 2023-10-16 PROCEDURE — C1760: CPT

## 2023-10-16 PROCEDURE — C1766: CPT

## 2023-10-16 PROCEDURE — 82330 ASSAY OF CALCIUM: CPT

## 2023-10-16 PROCEDURE — 75572 CT HRT W/3D IMAGE: CPT

## 2023-10-16 PROCEDURE — 93656 COMPRE EP EVAL ABLTJ ATR FIB: CPT

## 2023-10-16 PROCEDURE — C1769: CPT

## 2023-10-16 PROCEDURE — 80048 BASIC METABOLIC PNL TOTAL CA: CPT

## 2023-10-16 PROCEDURE — 85730 THROMBOPLASTIN TIME PARTIAL: CPT

## 2023-10-16 PROCEDURE — 36415 COLL VENOUS BLD VENIPUNCTURE: CPT

## 2023-10-16 PROCEDURE — 86900 BLOOD TYPING SEROLOGIC ABO: CPT

## 2023-10-16 PROCEDURE — C1759: CPT

## 2023-10-16 PROCEDURE — 86901 BLOOD TYPING SEROLOGIC RH(D): CPT

## 2023-10-16 PROCEDURE — 86850 RBC ANTIBODY SCREEN: CPT

## 2023-10-16 PROCEDURE — 85027 COMPLETE CBC AUTOMATED: CPT

## 2023-10-16 PROCEDURE — C1730: CPT

## 2023-10-16 PROCEDURE — 85610 PROTHROMBIN TIME: CPT

## 2023-10-16 PROCEDURE — 85014 HEMATOCRIT: CPT

## 2023-10-16 PROCEDURE — 84295 ASSAY OF SERUM SODIUM: CPT

## 2023-10-17 LAB
ISTAT INR: 1.2 — HIGH (ref 0.88–1.16)
ISTAT INR: 1.2 — HIGH (ref 0.88–1.16)
ISTAT PT: 14.5 SEC — HIGH (ref 10–12.9)
ISTAT PT: 14.5 SEC — HIGH (ref 10–12.9)
ISTAT VENOUS BE: 1 MMOL/L — SIGNIFICANT CHANGE UP (ref -2–3)
ISTAT VENOUS BE: 1 MMOL/L — SIGNIFICANT CHANGE UP (ref -2–3)
ISTAT VENOUS GLUCOSE: 163 MG/DL — HIGH (ref 70–99)
ISTAT VENOUS GLUCOSE: 163 MG/DL — HIGH (ref 70–99)
ISTAT VENOUS HCO3: 26 MMOL/L — SIGNIFICANT CHANGE UP (ref 23–28)
ISTAT VENOUS HCO3: 26 MMOL/L — SIGNIFICANT CHANGE UP (ref 23–28)
ISTAT VENOUS HEMATOCRIT: 46 % — SIGNIFICANT CHANGE UP (ref 39–50)
ISTAT VENOUS HEMATOCRIT: 46 % — SIGNIFICANT CHANGE UP (ref 39–50)
ISTAT VENOUS HEMOGLOBIN: 15.6 GM/DL — SIGNIFICANT CHANGE UP (ref 13–17)
ISTAT VENOUS HEMOGLOBIN: 15.6 GM/DL — SIGNIFICANT CHANGE UP (ref 13–17)
ISTAT VENOUS IONIZED CALCIUM: 1.15 MMOL/L — SIGNIFICANT CHANGE UP (ref 1.12–1.3)
ISTAT VENOUS IONIZED CALCIUM: 1.15 MMOL/L — SIGNIFICANT CHANGE UP (ref 1.12–1.3)
ISTAT VENOUS PCO2: 41 MMHG — SIGNIFICANT CHANGE UP (ref 41–51)
ISTAT VENOUS PCO2: 41 MMHG — SIGNIFICANT CHANGE UP (ref 41–51)
ISTAT VENOUS PH: 7.41 — SIGNIFICANT CHANGE UP (ref 7.31–7.41)
ISTAT VENOUS PH: 7.41 — SIGNIFICANT CHANGE UP (ref 7.31–7.41)
ISTAT VENOUS PO2: <66 MMHG — SIGNIFICANT CHANGE UP (ref 35–40)
ISTAT VENOUS PO2: <66 MMHG — SIGNIFICANT CHANGE UP (ref 35–40)
ISTAT VENOUS POTASSIUM: 3.6 MMOL/L — SIGNIFICANT CHANGE UP (ref 3.5–5.3)
ISTAT VENOUS POTASSIUM: 3.6 MMOL/L — SIGNIFICANT CHANGE UP (ref 3.5–5.3)
ISTAT VENOUS SO2: 74 % — SIGNIFICANT CHANGE UP
ISTAT VENOUS SO2: 74 % — SIGNIFICANT CHANGE UP
ISTAT VENOUS SODIUM: 143 MMOL/L — SIGNIFICANT CHANGE UP (ref 135–145)
ISTAT VENOUS SODIUM: 143 MMOL/L — SIGNIFICANT CHANGE UP (ref 135–145)
ISTAT VENOUS TCO2: 27 MMOL/L — SIGNIFICANT CHANGE UP (ref 22–31)
ISTAT VENOUS TCO2: 27 MMOL/L — SIGNIFICANT CHANGE UP (ref 22–31)

## 2023-11-29 DIAGNOSIS — Z92.89 PERSONAL HISTORY OF OTHER MEDICAL TREATMENT: ICD-10-CM

## 2023-12-08 ENCOUNTER — APPOINTMENT (OUTPATIENT)
Dept: HEART AND VASCULAR | Facility: CLINIC | Age: 58
End: 2023-12-08
Payer: COMMERCIAL

## 2023-12-08 ENCOUNTER — NON-APPOINTMENT (OUTPATIENT)
Age: 58
End: 2023-12-08

## 2023-12-08 VITALS — BODY MASS INDEX: 32.83 KG/M2 | HEART RATE: 58 BPM | HEIGHT: 75 IN | WEIGHT: 264 LBS

## 2023-12-08 VITALS
RESPIRATION RATE: 16 BRPM | TEMPERATURE: 97.7 F | SYSTOLIC BLOOD PRESSURE: 130 MMHG | DIASTOLIC BLOOD PRESSURE: 87 MMHG | OXYGEN SATURATION: 97 %

## 2023-12-08 PROCEDURE — 99214 OFFICE O/P EST MOD 30 MIN: CPT | Mod: 25

## 2023-12-08 PROCEDURE — 93000 ELECTROCARDIOGRAM COMPLETE: CPT

## 2024-01-02 ENCOUNTER — RX RENEWAL (OUTPATIENT)
Age: 59
End: 2024-01-02

## 2024-01-02 RX ORDER — METFORMIN HYDROCHLORIDE 1000 MG/1
1000 TABLET, COATED ORAL
Qty: 180 | Refills: 3 | Status: ACTIVE | COMMUNITY
Start: 2018-12-13 | End: 1900-01-01

## 2024-01-19 RX ORDER — AMLODIPINE BESYLATE 10 MG/1
10 TABLET ORAL
Qty: 90 | Refills: 3 | Status: ACTIVE | COMMUNITY
Start: 2018-12-06 | End: 1900-01-01

## 2024-03-31 ENCOUNTER — RX RENEWAL (OUTPATIENT)
Age: 59
End: 2024-03-31

## 2024-03-31 RX ORDER — VALSARTAN 80 MG/1
80 TABLET, COATED ORAL TWICE DAILY
Qty: 180 | Refills: 3 | Status: ACTIVE | COMMUNITY
Start: 2023-04-19 | End: 1900-01-01

## 2024-04-02 ENCOUNTER — TRANSCRIPTION ENCOUNTER (OUTPATIENT)
Age: 59
End: 2024-04-02

## 2024-04-10 ENCOUNTER — NON-APPOINTMENT (OUTPATIENT)
Age: 59
End: 2024-04-10

## 2024-04-10 ENCOUNTER — APPOINTMENT (OUTPATIENT)
Dept: HEART AND VASCULAR | Facility: CLINIC | Age: 59
End: 2024-04-10
Payer: COMMERCIAL

## 2024-04-10 VITALS
DIASTOLIC BLOOD PRESSURE: 89 MMHG | HEIGHT: 75 IN | RESPIRATION RATE: 16 BRPM | TEMPERATURE: 97.6 F | SYSTOLIC BLOOD PRESSURE: 144 MMHG | OXYGEN SATURATION: 95 % | HEART RATE: 58 BPM

## 2024-04-10 DIAGNOSIS — I48.0 PAROXYSMAL ATRIAL FIBRILLATION: ICD-10-CM

## 2024-04-10 DIAGNOSIS — Z79.01 LONG TERM (CURRENT) USE OF ANTICOAGULANTS: ICD-10-CM

## 2024-04-10 PROCEDURE — 93000 ELECTROCARDIOGRAM COMPLETE: CPT

## 2024-04-10 PROCEDURE — G2211 COMPLEX E/M VISIT ADD ON: CPT

## 2024-04-10 PROCEDURE — 99213 OFFICE O/P EST LOW 20 MIN: CPT

## 2024-04-11 PROBLEM — I48.0 PAROXYSMAL ATRIAL FIBRILLATION: Status: ACTIVE | Noted: 2023-04-17

## 2024-04-11 PROBLEM — Z79.01 CURRENT USE OF LONG TERM ANTICOAGULATION: Status: ACTIVE | Noted: 2023-12-08

## 2024-04-11 NOTE — ADDENDUM
[FreeTextEntry1] : I, Viktoria Lyles, am scribing for and the presence of Dr. Davis the following sections: HPI, PMH,Family/social history, ROS, Physical Exam, Assessment / Plan. I, Quirino Davis, personally performed the services described in the documentation, reviewed the documentation recorded by the scribe in my presence and it accurately and completely records my words and actions.

## 2024-04-11 NOTE — HISTORY OF PRESENT ILLNESS
[FreeTextEntry1] : 58 y.o. 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  He continues to feel well.  No recurrent sustained palpitation or arrhythmia symptoms.  He is back to exercising.  Tolerating Eliquis without bleeding.  Echo (4/2023): LVEF 71%, mild LVH, ZIO (5/2023): AF burden 5%, longest 8h 8m, avg rate in AF 71 bpm

## 2024-04-14 ENCOUNTER — RX RENEWAL (OUTPATIENT)
Age: 59
End: 2024-04-14

## 2024-04-14 RX ORDER — APIXABAN 5 MG/1
5 TABLET, FILM COATED ORAL
Qty: 180 | Refills: 3 | Status: ACTIVE | COMMUNITY
Start: 2023-04-12 | End: 1900-01-01

## 2024-07-01 ENCOUNTER — RX RENEWAL (OUTPATIENT)
Age: 59
End: 2024-07-01

## 2024-07-08 ENCOUNTER — APPOINTMENT (OUTPATIENT)
Dept: FAMILY MEDICINE | Facility: CLINIC | Age: 59
End: 2024-07-08
Payer: COMMERCIAL

## 2024-07-08 VITALS
OXYGEN SATURATION: 96 % | DIASTOLIC BLOOD PRESSURE: 72 MMHG | SYSTOLIC BLOOD PRESSURE: 132 MMHG | HEART RATE: 65 BPM | WEIGHT: 269 LBS | BODY MASS INDEX: 33.45 KG/M2 | HEIGHT: 75 IN

## 2024-07-08 DIAGNOSIS — E78.5 HYPERLIPIDEMIA, UNSPECIFIED: ICD-10-CM

## 2024-07-08 DIAGNOSIS — I10 ESSENTIAL (PRIMARY) HYPERTENSION: ICD-10-CM

## 2024-07-08 DIAGNOSIS — E07.9 DISORDER OF THYROID, UNSPECIFIED: ICD-10-CM

## 2024-07-08 DIAGNOSIS — I48.0 PAROXYSMAL ATRIAL FIBRILLATION: ICD-10-CM

## 2024-07-08 DIAGNOSIS — Z00.00 ENCOUNTER FOR GENERAL ADULT MEDICAL EXAMINATION W/OUT ABNORMAL FINDINGS: ICD-10-CM

## 2024-07-08 DIAGNOSIS — E11.9 TYPE 2 DIABETES MELLITUS W/OUT COMPLICATIONS: ICD-10-CM

## 2024-07-08 DIAGNOSIS — G47.09 OTHER INSOMNIA: ICD-10-CM

## 2024-07-08 DIAGNOSIS — E66.9 OBESITY, UNSPECIFIED: ICD-10-CM

## 2024-07-08 PROCEDURE — 99396 PREV VISIT EST AGE 40-64: CPT

## 2024-07-08 PROCEDURE — 99214 OFFICE O/P EST MOD 30 MIN: CPT | Mod: 25

## 2024-07-08 RX ORDER — ZOLPIDEM TARTRATE 5 MG/1
5 TABLET ORAL
Qty: 10 | Refills: 0 | Status: ACTIVE | COMMUNITY
Start: 2024-07-08 | End: 1900-01-01

## 2024-07-10 LAB
ALBUMIN SERPL ELPH-MCNC: 4.7 G/DL
ALP BLD-CCNC: 53 U/L
ALT SERPL-CCNC: 54 U/L
ANION GAP SERPL CALC-SCNC: 16 MMOL/L
AST SERPL-CCNC: 46 U/L
BASOPHILS # BLD AUTO: 0.06 K/UL
BASOPHILS NFR BLD AUTO: 0.8 %
BILIRUB SERPL-MCNC: 0.9 MG/DL
BUN SERPL-MCNC: 18 MG/DL
CALCIUM SERPL-MCNC: 9.6 MG/DL
CHLORIDE SERPL-SCNC: 103 MMOL/L
CO2 SERPL-SCNC: 24 MMOL/L
CREAT SERPL-MCNC: 0.74 MG/DL
EGFR: 104 ML/MIN/1.73M2
EOSINOPHIL # BLD AUTO: 0 K/UL
EOSINOPHIL NFR BLD AUTO: 0 %
ESTIMATED AVERAGE GLUCOSE: 103 MG/DL
GLUCOSE SERPL-MCNC: 65 MG/DL
HBA1C MFR BLD HPLC: 5.2 %
HCT VFR BLD CALC: 51.6 %
HDLC SERPL-MCNC: 32 MG/DL
HGB BLD-MCNC: 16 G/DL
IMM GRANULOCYTES NFR BLD AUTO: 0.6 %
LDLC SERPL CALC-MCNC: 58 MG/DL
LYMPHOCYTES # BLD AUTO: 2.12 K/UL
MAN DIFF?: NORMAL
MCHC RBC-ENTMCNC: 30.5 PG
MCHC RBC-ENTMCNC: 31 GM/DL
MCV RBC AUTO: 98.5 FL
MONOCYTES # BLD AUTO: 0.42 K/UL
MONOCYTES NFR BLD AUTO: 5.9 %
NEUTROPHILS # BLD AUTO: 4.47 K/UL
NEUTROPHILS NFR BLD AUTO: 62.9 %
NONHDLC SERPL-MCNC: 102 MG/DL
PLATELET # BLD AUTO: 193 K/UL
POTASSIUM SERPL-SCNC: 4 MMOL/L
PROT SERPL-MCNC: 7.4 G/DL
PSA SERPL-MCNC: 1.91 NG/ML
RBC # BLD: 5.24 M/UL
RBC # FLD: 15.5 %
SODIUM SERPL-SCNC: 143 MMOL/L
TRIGL SERPL-MCNC: 280 MG/DL
TSH SERPL-ACNC: 1.89 UIU/ML
WBC # FLD AUTO: 7.11 K/UL

## 2024-07-10 RX ORDER — TIRZEPATIDE 2.5 MG/.5ML
2.5 INJECTION, SOLUTION SUBCUTANEOUS
Qty: 1 | Refills: 0 | Status: DISCONTINUED | COMMUNITY
Start: 2024-07-08 | End: 2024-07-10

## 2024-08-05 ENCOUNTER — RESULT REVIEW (OUTPATIENT)
Age: 59
End: 2024-08-05

## 2024-08-06 ENCOUNTER — APPOINTMENT (OUTPATIENT)
Dept: GASTROENTEROLOGY | Facility: HOSPITAL | Age: 59
End: 2024-08-06

## 2025-03-25 ENCOUNTER — RX RENEWAL (OUTPATIENT)
Age: 60
End: 2025-03-25

## 2025-03-27 ENCOUNTER — NON-APPOINTMENT (OUTPATIENT)
Age: 60
End: 2025-03-27

## 2025-04-01 ENCOUNTER — APPOINTMENT (OUTPATIENT)
Dept: HEART AND VASCULAR | Facility: CLINIC | Age: 60
End: 2025-04-01
Payer: COMMERCIAL

## 2025-04-01 VITALS
BODY MASS INDEX: 33.57 KG/M2 | HEIGHT: 75 IN | DIASTOLIC BLOOD PRESSURE: 80 MMHG | OXYGEN SATURATION: 96 % | WEIGHT: 270 LBS | HEART RATE: 55 BPM | SYSTOLIC BLOOD PRESSURE: 118 MMHG

## 2025-04-01 DIAGNOSIS — E11.9 TYPE 2 DIABETES MELLITUS W/OUT COMPLICATIONS: ICD-10-CM

## 2025-04-01 DIAGNOSIS — I48.0 PAROXYSMAL ATRIAL FIBRILLATION: ICD-10-CM

## 2025-04-01 DIAGNOSIS — I10 ESSENTIAL (PRIMARY) HYPERTENSION: ICD-10-CM

## 2025-04-01 PROCEDURE — 99204 OFFICE O/P NEW MOD 45 MIN: CPT

## 2025-04-01 PROCEDURE — 99214 OFFICE O/P EST MOD 30 MIN: CPT

## 2025-04-01 PROCEDURE — 93246 EXT ECG>7D<15D RECORDING: CPT

## 2025-04-29 PROCEDURE — 93248 EXT ECG>7D<15D REV&INTERPJ: CPT

## 2025-07-07 ENCOUNTER — NON-APPOINTMENT (OUTPATIENT)
Age: 60
End: 2025-07-07

## 2025-07-22 ENCOUNTER — NON-APPOINTMENT (OUTPATIENT)
Age: 60
End: 2025-07-22

## 2025-09-16 ENCOUNTER — RX RENEWAL (OUTPATIENT)
Age: 60
End: 2025-09-16

## 2025-09-17 ENCOUNTER — RX RENEWAL (OUTPATIENT)
Age: 60
End: 2025-09-17